# Patient Record
Sex: MALE | Race: WHITE | NOT HISPANIC OR LATINO | Employment: STUDENT | URBAN - METROPOLITAN AREA
[De-identification: names, ages, dates, MRNs, and addresses within clinical notes are randomized per-mention and may not be internally consistent; named-entity substitution may affect disease eponyms.]

---
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
## 2023-09-20 ENCOUNTER — HOSPITAL ENCOUNTER (EMERGENCY)
Facility: HOSPITAL | Age: 23
End: 2023-09-21
Attending: EMERGENCY MEDICINE
Payer: COMMERCIAL

## 2023-09-20 DIAGNOSIS — F32.A DEPRESSION WITH SUICIDAL IDEATION: Primary | ICD-10-CM

## 2023-09-20 DIAGNOSIS — R45.851 DEPRESSION WITH SUICIDAL IDEATION: Primary | ICD-10-CM

## 2023-09-20 LAB
AMPHETAMINES SERPL QL SCN: NEGATIVE
BARBITURATES UR QL: NEGATIVE
BENZODIAZ UR QL: NEGATIVE
COCAINE UR QL: NEGATIVE
ETHANOL EXG-MCNC: 0 MG/DL
FLUAV RNA RESP QL NAA+PROBE: NEGATIVE
FLUBV RNA RESP QL NAA+PROBE: NEGATIVE
METHADONE UR QL: NEGATIVE
OPIATES UR QL SCN: NEGATIVE
OXYCODONE+OXYMORPHONE UR QL SCN: NEGATIVE
PCP UR QL: NEGATIVE
RSV RNA RESP QL NAA+PROBE: NEGATIVE
SARS-COV-2 RNA RESP QL NAA+PROBE: NEGATIVE
THC UR QL: NEGATIVE

## 2023-09-20 PROCEDURE — 99285 EMERGENCY DEPT VISIT HI MDM: CPT

## 2023-09-20 PROCEDURE — 99285 EMERGENCY DEPT VISIT HI MDM: CPT | Performed by: EMERGENCY MEDICINE

## 2023-09-20 PROCEDURE — 80307 DRUG TEST PRSMV CHEM ANLYZR: CPT | Performed by: EMERGENCY MEDICINE

## 2023-09-20 PROCEDURE — 82075 ASSAY OF BREATH ETHANOL: CPT | Performed by: EMERGENCY MEDICINE

## 2023-09-20 PROCEDURE — 0241U HB NFCT DS VIR RESP RNA 4 TRGT: CPT | Performed by: EMERGENCY MEDICINE

## 2023-09-20 RX ORDER — ACETAMINOPHEN 325 MG/1
650 TABLET ORAL EVERY 6 HOURS PRN
Status: CANCELLED | OUTPATIENT
Start: 2023-09-20

## 2023-09-20 RX ORDER — ACETAMINOPHEN 325 MG/1
650 TABLET ORAL EVERY 4 HOURS PRN
Status: CANCELLED | OUTPATIENT
Start: 2023-09-20

## 2023-09-20 RX ORDER — MAGNESIUM HYDROXIDE/ALUMINUM HYDROXICE/SIMETHICONE 120; 1200; 1200 MG/30ML; MG/30ML; MG/30ML
30 SUSPENSION ORAL EVERY 4 HOURS PRN
Status: CANCELLED | OUTPATIENT
Start: 2023-09-20

## 2023-09-20 RX ORDER — TRAZODONE HYDROCHLORIDE 50 MG/1
50 TABLET ORAL
Status: CANCELLED | OUTPATIENT
Start: 2023-09-20

## 2023-09-20 RX ORDER — LORAZEPAM 2 MG/ML
2 INJECTION INTRAMUSCULAR EVERY 6 HOURS PRN
Status: CANCELLED | OUTPATIENT
Start: 2023-09-20

## 2023-09-20 RX ORDER — BISACODYL 10 MG
10 SUPPOSITORY, RECTAL RECTAL DAILY PRN
Status: CANCELLED | OUTPATIENT
Start: 2023-09-20

## 2023-09-20 RX ORDER — POLYETHYLENE GLYCOL 3350 17 G/17G
17 POWDER, FOR SOLUTION ORAL DAILY PRN
Status: CANCELLED | OUTPATIENT
Start: 2023-09-20

## 2023-09-20 RX ORDER — BENZTROPINE MESYLATE 1 MG/ML
0.5 INJECTION INTRAMUSCULAR; INTRAVENOUS
Status: CANCELLED | OUTPATIENT
Start: 2023-09-20

## 2023-09-20 RX ORDER — LANOLIN ALCOHOL/MO/W.PET/CERES
3 CREAM (GRAM) TOPICAL
Status: CANCELLED | OUTPATIENT
Start: 2023-09-20

## 2023-09-20 RX ORDER — HALOPERIDOL 5 MG/ML
5 INJECTION INTRAMUSCULAR
Status: CANCELLED | OUTPATIENT
Start: 2023-09-20

## 2023-09-20 RX ORDER — BENZTROPINE MESYLATE 1 MG/1
1 TABLET ORAL
Status: CANCELLED | OUTPATIENT
Start: 2023-09-20

## 2023-09-20 RX ORDER — HALOPERIDOL 1 MG/1
1 TABLET ORAL EVERY 6 HOURS PRN
Status: CANCELLED | OUTPATIENT
Start: 2023-09-20

## 2023-09-20 RX ORDER — ACETAMINOPHEN 325 MG/1
975 TABLET ORAL EVERY 6 HOURS PRN
Status: CANCELLED | OUTPATIENT
Start: 2023-09-20

## 2023-09-20 RX ORDER — HALOPERIDOL 1 MG/1
2.5 TABLET ORAL
Status: CANCELLED | OUTPATIENT
Start: 2023-09-20

## 2023-09-20 RX ORDER — LORAZEPAM 2 MG/ML
2 INJECTION INTRAMUSCULAR
Status: CANCELLED | OUTPATIENT
Start: 2023-09-20

## 2023-09-20 RX ORDER — HYDROXYZINE HYDROCHLORIDE 25 MG/1
50 TABLET, FILM COATED ORAL
Status: CANCELLED | OUTPATIENT
Start: 2023-09-20

## 2023-09-20 RX ORDER — HYDROXYZINE HYDROCHLORIDE 25 MG/1
25 TABLET, FILM COATED ORAL
Status: CANCELLED | OUTPATIENT
Start: 2023-09-20

## 2023-09-20 RX ORDER — AMOXICILLIN 250 MG
1 CAPSULE ORAL DAILY PRN
Status: CANCELLED | OUTPATIENT
Start: 2023-09-20

## 2023-09-20 RX ORDER — HALOPERIDOL 5 MG/ML
2.5 INJECTION INTRAMUSCULAR
Status: CANCELLED | OUTPATIENT
Start: 2023-09-20

## 2023-09-20 RX ORDER — DIPHENHYDRAMINE HYDROCHLORIDE 50 MG/ML
50 INJECTION INTRAMUSCULAR; INTRAVENOUS EVERY 6 HOURS PRN
Status: CANCELLED | OUTPATIENT
Start: 2023-09-20

## 2023-09-20 RX ORDER — LORAZEPAM 2 MG/ML
1 INJECTION INTRAMUSCULAR
Status: CANCELLED | OUTPATIENT
Start: 2023-09-20

## 2023-09-20 RX ORDER — HYDROXYZINE HYDROCHLORIDE 25 MG/1
100 TABLET, FILM COATED ORAL
Status: CANCELLED | OUTPATIENT
Start: 2023-09-20

## 2023-09-20 RX ORDER — HALOPERIDOL 5 MG/1
5 TABLET ORAL
Status: CANCELLED | OUTPATIENT
Start: 2023-09-20

## 2023-09-20 RX ORDER — BENZTROPINE MESYLATE 1 MG/ML
1 INJECTION INTRAMUSCULAR; INTRAVENOUS
Status: CANCELLED | OUTPATIENT
Start: 2023-09-20

## 2023-09-20 NOTE — LETTER
[address] [facility] [address]  Dept: [phone]      [id number] TRANSFER CONSENT    NAME Juan Carlos Posada DOB 2000                              MRN [id number]    I have been informed of my rights regarding examination, treatment, and transfer   by Dr. Bernarda Alamo DO    Benefits: Specialized equipment and/or services available at the receiving facility (Include comment)________________________    Risks: Potential for delay in receiving treatment      Consent for Transfer:  I acknowledge that my medical condition has been evaluated and explained to me by the emergency department physician or other qualified medical person and/or my attending physician, who has recommended that I be transferred to the service of  Accepting Physician: Dr. Mark Kamara at [address] [place] [address] [address] Name, [address] : [facility], [address] [facility] AFFILIATED WITH [address] The above potential benefits of such transfer, the potential risks associated with such transfer, and the probable risks of not being transferred have been explained to me, and I fully understand them. The doctor has explained that, in my case, the benefits of transfer outweigh the risks. I agree to be transferred. I authorize the performance of emergency medical procedures and treatments upon me in both transit and upon arrival at the receiving facility. Additionally, I authorize the release of any and all medical records to the receiving facility and request they be transported with me, if possible. I understand that the safest mode of transportation during a medical emergency is an ambulance and that the Hospital advocates the use of this mode of transport.  Risks of traveling to the receiving facility by car, including absence of medical control, life sustaining equipment, such as oxygen, and medical personnel has been explained to me and I fully understand them.    ([address])  [X]  I consent to the stated transfer and to be transported by ambulance/helicopter. [  ]  I consent to the stated transfer, but refuse transportation by ambulance and accept full responsibility for my transportation by car. I understand the risks of non-ambulance transfers and I exonerate the Hospital and its staff from any deterioration in my condition that results from this refusal.    X___________________________________________    DATE  23  TIME________  Signature of patient or legally responsible individual signing on patient behalf           RELATIONSHIP TO PATIENT_________________________                  Provider Certification    NAME Zhanna Dickey                                         2000                              MRN [id number]    A medical screening exam was performed on the above named patient. Based on the examination:    Condition Necessitating Transfer The encounter diagnosis was Depression with suicidal ideation.     Patient Condition: The patient has been stabilized such that within reasonable medical probability, no material deterioration of the patient condition or the condition of the unborn child(rolly) is likely to result from the transfer    Reason for Transfer: Level of Care needed not available at this facility    Transfer Requirements: [address] [place] [address]   Space available and qualified personnel available for treatment as acknowledged by Cheryle Coma, [address] [facility], [phone]  Agreed to accept transfer and to provide appropriate medical treatment as acknowledged by       Dr. Pablo De Santiago  Appropriate medical records of the examination and treatment of the patient are provided at the time of transfer   [address] _______  Transfer will be performed by qualified personnel from [address]  and appropriate transfer equipment as required, including the use of necessary and appropriate life support measures. Provider Certification: I have examined the patient and explained the following risks and benefits of being transferred/refusing transfer to the patient/family:         Based on these reasonable risks and benefits to the patient and/or the unborn child(rolly), and based upon the information available at the time of the patient’s examination, I certify that the medical benefits reasonably to be expected from the provision of appropriate medical treatments at another medical facility outweigh the increasing risks, if any, to the individual’s medical condition, and in the case of labor to the unborn child, from effecting the transfer.     X____________________________________________ DATE 09/21/23        TIME_______      ORIGINAL - SEND TO MEDICAL RECORDS   COPY - SEND WITH PATIENT DURING TRANSFER

## 2023-09-21 ENCOUNTER — HOSPITAL ENCOUNTER (INPATIENT)
Facility: HOSPITAL | Age: 23
LOS: 7 days | Discharge: HOME | DRG: 753 | End: 2023-09-28
Attending: PSYCHIATRY & NEUROLOGY | Admitting: PSYCHIATRY & NEUROLOGY
Payer: COMMERCIAL

## 2023-09-21 VITALS
DIASTOLIC BLOOD PRESSURE: 50 MMHG | SYSTOLIC BLOOD PRESSURE: 107 MMHG | TEMPERATURE: 98.1 F | OXYGEN SATURATION: 97 % | HEART RATE: 100 BPM | RESPIRATION RATE: 18 BRPM

## 2023-09-21 DIAGNOSIS — F41.9 ANXIETY: ICD-10-CM

## 2023-09-21 DIAGNOSIS — R45.851 DEPRESSION WITH SUICIDAL IDEATION: ICD-10-CM

## 2023-09-21 DIAGNOSIS — Z72.0 TOBACCO ABUSE: ICD-10-CM

## 2023-09-21 DIAGNOSIS — E55.9 VITAMIN D DEFICIENCY: ICD-10-CM

## 2023-09-21 DIAGNOSIS — F32.A DEPRESSION WITH SUICIDAL IDEATION: ICD-10-CM

## 2023-09-21 DIAGNOSIS — G47.00 INSOMNIA: ICD-10-CM

## 2023-09-21 DIAGNOSIS — F32.A DEPRESSIVE DISORDER: Primary | ICD-10-CM

## 2023-09-21 DIAGNOSIS — E53.8 VITAMIN B12 DEFICIENCY: ICD-10-CM

## 2023-09-21 DIAGNOSIS — J45.909 ASTHMA: ICD-10-CM

## 2023-09-21 LAB
ATRIAL RATE: 85 BPM
P AXIS: 52 DEGREES
PR INTERVAL: 142 MS
QRS AXIS: 87 DEGREES
QRSD INTERVAL: 84 MS
QT INTERVAL: 356 MS
QTC INTERVAL: 423 MS
T WAVE AXIS: 50 DEGREES
VENTRICULAR RATE: 85 BPM

## 2023-09-21 PROCEDURE — 93005 ELECTROCARDIOGRAM TRACING: CPT

## 2023-09-21 PROCEDURE — 93010 ELECTROCARDIOGRAM REPORT: CPT | Performed by: INTERNAL MEDICINE

## 2023-09-21 RX ORDER — HYDROXYZINE 50 MG/1
50 TABLET, FILM COATED ORAL
Status: DISCONTINUED | OUTPATIENT
Start: 2023-09-21 | End: 2023-09-28 | Stop reason: HOSPADM

## 2023-09-21 RX ORDER — BENZTROPINE MESYLATE 1 MG/1
1 TABLET ORAL
Status: DISCONTINUED | OUTPATIENT
Start: 2023-09-21 | End: 2023-09-28 | Stop reason: HOSPADM

## 2023-09-21 RX ORDER — LANOLIN ALCOHOL/MO/W.PET/CERES
3 CREAM (GRAM) TOPICAL
Status: DISCONTINUED | OUTPATIENT
Start: 2023-09-21 | End: 2023-09-28 | Stop reason: HOSPADM

## 2023-09-21 RX ORDER — LORAZEPAM 2 MG/ML
2 INJECTION INTRAMUSCULAR
Status: DISCONTINUED | OUTPATIENT
Start: 2023-09-21 | End: 2023-09-28 | Stop reason: HOSPADM

## 2023-09-21 RX ORDER — HALOPERIDOL 5 MG/1
5 TABLET ORAL
Status: DISCONTINUED | OUTPATIENT
Start: 2023-09-21 | End: 2023-09-28 | Stop reason: HOSPADM

## 2023-09-21 RX ORDER — HYDROXYZINE HYDROCHLORIDE 25 MG/1
25 TABLET, FILM COATED ORAL
Status: DISCONTINUED | OUTPATIENT
Start: 2023-09-21 | End: 2023-09-28 | Stop reason: HOSPADM

## 2023-09-21 RX ORDER — POLYETHYLENE GLYCOL 3350 17 G/17G
17 POWDER, FOR SOLUTION ORAL DAILY PRN
Status: DISCONTINUED | OUTPATIENT
Start: 2023-09-21 | End: 2023-09-23

## 2023-09-21 RX ORDER — TRAZODONE HYDROCHLORIDE 50 MG/1
50 TABLET ORAL
Status: DISCONTINUED | OUTPATIENT
Start: 2023-09-21 | End: 2023-09-25

## 2023-09-21 RX ORDER — MAGNESIUM HYDROXIDE/ALUMINUM HYDROXICE/SIMETHICONE 120; 1200; 1200 MG/30ML; MG/30ML; MG/30ML
30 SUSPENSION ORAL EVERY 4 HOURS PRN
Status: DISCONTINUED | OUTPATIENT
Start: 2023-09-21 | End: 2023-09-28 | Stop reason: HOSPADM

## 2023-09-21 RX ORDER — HALOPERIDOL 5 MG/ML
2.5 INJECTION INTRAMUSCULAR
Status: DISCONTINUED | OUTPATIENT
Start: 2023-09-21 | End: 2023-09-28 | Stop reason: HOSPADM

## 2023-09-21 RX ORDER — DIPHENHYDRAMINE HYDROCHLORIDE 50 MG/ML
50 INJECTION INTRAMUSCULAR; INTRAVENOUS EVERY 6 HOURS PRN
Status: DISCONTINUED | OUTPATIENT
Start: 2023-09-21 | End: 2023-09-28 | Stop reason: HOSPADM

## 2023-09-21 RX ORDER — ACETAMINOPHEN 325 MG/1
975 TABLET ORAL EVERY 6 HOURS PRN
Status: DISCONTINUED | OUTPATIENT
Start: 2023-09-21 | End: 2023-09-28 | Stop reason: HOSPADM

## 2023-09-21 RX ORDER — AMOXICILLIN 250 MG
1 CAPSULE ORAL DAILY PRN
Status: DISCONTINUED | OUTPATIENT
Start: 2023-09-21 | End: 2023-09-28 | Stop reason: HOSPADM

## 2023-09-21 RX ORDER — HYDROXYZINE 50 MG/1
100 TABLET, FILM COATED ORAL
Status: DISCONTINUED | OUTPATIENT
Start: 2023-09-21 | End: 2023-09-28 | Stop reason: HOSPADM

## 2023-09-21 RX ORDER — LORAZEPAM 2 MG/ML
2 INJECTION INTRAMUSCULAR EVERY 6 HOURS PRN
Status: DISCONTINUED | OUTPATIENT
Start: 2023-09-21 | End: 2023-09-28 | Stop reason: HOSPADM

## 2023-09-21 RX ORDER — BENZTROPINE MESYLATE 1 MG/ML
0.5 INJECTION INTRAMUSCULAR; INTRAVENOUS
Status: DISCONTINUED | OUTPATIENT
Start: 2023-09-21 | End: 2023-09-28 | Stop reason: HOSPADM

## 2023-09-21 RX ORDER — ALBUTEROL SULFATE 90 UG/1
1 AEROSOL, METERED RESPIRATORY (INHALATION) EVERY 4 HOURS PRN
Status: DISCONTINUED | OUTPATIENT
Start: 2023-09-21 | End: 2023-09-28 | Stop reason: HOSPADM

## 2023-09-21 RX ORDER — ACETAMINOPHEN 325 MG/1
650 TABLET ORAL EVERY 4 HOURS PRN
Status: DISCONTINUED | OUTPATIENT
Start: 2023-09-21 | End: 2023-09-28 | Stop reason: HOSPADM

## 2023-09-21 RX ORDER — BISACODYL 10 MG
10 SUPPOSITORY, RECTAL RECTAL DAILY PRN
Status: DISCONTINUED | OUTPATIENT
Start: 2023-09-21 | End: 2023-09-23

## 2023-09-21 RX ORDER — HALOPERIDOL 5 MG/ML
5 INJECTION INTRAMUSCULAR
Status: DISCONTINUED | OUTPATIENT
Start: 2023-09-21 | End: 2023-09-28 | Stop reason: HOSPADM

## 2023-09-21 RX ORDER — LORAZEPAM 2 MG/ML
1 INJECTION INTRAMUSCULAR
Status: DISCONTINUED | OUTPATIENT
Start: 2023-09-21 | End: 2023-09-28 | Stop reason: HOSPADM

## 2023-09-21 RX ORDER — ACETAMINOPHEN 325 MG/1
650 TABLET ORAL EVERY 6 HOURS PRN
Status: DISCONTINUED | OUTPATIENT
Start: 2023-09-21 | End: 2023-09-28 | Stop reason: HOSPADM

## 2023-09-21 RX ORDER — HALOPERIDOL 0.5 MG/1
1 TABLET ORAL EVERY 6 HOURS PRN
Status: DISCONTINUED | OUTPATIENT
Start: 2023-09-21 | End: 2023-09-28 | Stop reason: HOSPADM

## 2023-09-21 RX ORDER — BENZTROPINE MESYLATE 1 MG/ML
1 INJECTION INTRAMUSCULAR; INTRAVENOUS
Status: DISCONTINUED | OUTPATIENT
Start: 2023-09-21 | End: 2023-09-28 | Stop reason: HOSPADM

## 2023-09-21 RX ADMIN — MELATONIN 3 MG: at 21:11

## 2023-09-21 RX ADMIN — HYDROXYZINE HYDROCHLORIDE 50 MG: 50 TABLET, FILM COATED ORAL at 21:37

## 2023-09-21 RX ADMIN — NICOTINE POLACRILEX 2 MG: 2 GUM, CHEWING BUCCAL at 21:09

## 2023-09-21 NOTE — ED PROVIDER NOTES
Pt Name: Robin Granados  MRN: [id number]  [address] 2000  Age/Sex: 26 y.o. male  Date of evaluation: 9/20/2023  PCP: [address]    Chief Complaint   Patient presents with   • Depression     Pt went for a physical this morning and told the Dr he was struggling with anxiety and depression. Actively suicidal with a plan. HPI    26 y.o. male presenting with suicidal ideation. Patient states been struggling with depression for some time, has had worsening feelings of depression and hopelessness over the past few weeks, has also been having significantly worsening suicidal thoughts, he states that he has been preparing for his suicide by pushing others away and trying to isolate himself from others so that he hurts a few people as possible when he does kill himself. Patient verbalized having a plan to other staff but is unwilling to discuss with me. He denies homicidal ideation. Patient states that he went to physical exam with a new doctor this morning, screened positive on [place] suicide severity scale, was sent to the ER for further evaluation. HPI      Past Medical and Surgical History    Past Medical History:   Diagnosis Date   • Asthma    • Growth delay    • Testosterone deficiency        History reviewed. No pertinent surgical history. History reviewed. No pertinent family history. Social History     Tobacco Use   • Smoking status: Never   Vaping Use   • Vaping Use: Every day   • Substances: Nicotine   Substance Use Topics   • Alcohol use: Not Currently   • Drug use: Not Currently           Allergies    No Known Allergies    Home Medications    Prior to Admission medications    Medication Sig Start Date End Date Taking?  Authorizing Provider   albuterol (VENTOLIN HFA) 90 mcg/act inhaler Inhale 108 mcg as needed 8/15/16   Historical Provider, MD   famciclovir [facility]) 500 mg tablet Take 1 tablet by mouth 3 (three) times a day for 10 days 1/30/17 2/9/17  Teresa Marshall Pushpa Allison MD           Review of Systems    Review of Systems   Constitutional: Negative for appetite change, chills and diaphoresis. HENT: Negative for drooling, facial swelling, trouble swallowing and voice change. Respiratory: Negative for apnea, shortness of breath and wheezing. Cardiovascular: Negative for chest pain and leg swelling. Gastrointestinal: Negative for abdominal distention, abdominal pain, diarrhea, nausea and vomiting. Genitourinary: Negative for dysuria and urgency. Musculoskeletal: Negative for arthralgias, back pain, gait problem and neck pain. Skin: Negative for color change, rash and wound. Neurological: Negative for seizures, speech difficulty, weakness and headaches. Psychiatric/Behavioral: Positive for dysphoric mood and suicidal ideas. Negative for agitation, behavioral problems and decreased concentration. The patient is not nervous/anxious. All other systems reviewed and negative. Physical Exam      ED Triage Vitals   Temperature Pulse Respirations Blood Pressure SpO2   09/20/23 1940 09/20/23 1939 09/20/23 1939 09/20/23 1939 09/20/23 1939   99.3 °F (37.4 °C) 95 20 124/76 91 %      Temp Source Heart Rate Source Patient Position - Orthostatic VS BP Location FiO2 (%)   09/20/23 1940 09/20/23 1939 09/20/23 1939 09/20/23 1939 --   Temporal Monitor Sitting Left arm       Pain Score       --                      Physical Exam  Vitals and nursing note reviewed. Constitutional:       General: He is not in acute distress. Appearance: He is well-developed. He is not ill-appearing, toxic-appearing or diaphoretic. HENT:      Head: Normocephalic and atraumatic. Right Ear: External ear normal.      Left Ear: External ear normal.      Nose: Nose normal. No congestion or rhinorrhea. Mouth/Throat:      Mouth: Mucous membranes are moist.      Pharynx: Oropharynx is clear.    Eyes:      Conjunctiva/sclera: Conjunctivae normal.      Pupils: Pupils are equal, round, and reactive to light. Neck:      Trachea: No tracheal deviation. Cardiovascular:      Rate and Rhythm: Normal rate and regular rhythm. Pulses: Normal pulses. Heart sounds: Normal heart sounds. No murmur heard. Pulmonary:      Effort: Pulmonary effort is normal. No respiratory distress. Breath sounds: Normal breath sounds. No stridor. No wheezing or rales. Abdominal:      General: There is no distension. Palpations: Abdomen is soft. Tenderness: There is no abdominal tenderness. There is no guarding or rebound. Musculoskeletal:         General: No deformity. Normal range of motion. Cervical back: Normal range of motion and neck supple. No rigidity or tenderness. Right lower leg: No edema. Left lower leg: No edema. Skin:     General: Skin is warm and dry. Capillary Refill: Capillary refill takes less than 2 seconds. Findings: No rash. Neurological:      Mental Status: He is alert and oriented to person, place, and time. Psychiatric:      Comments: Endorsing suicidal ideation as well as depression and anxiety, significant feelings of hopelessness, judgment slightly impaired but still intact, good insight and seeking care at this time. Diagnostic Results      Labs:    Results Reviewed     Procedure Component Value Units Date/Time    FLU/RSV/COVID - if FLU/RSV clinically relevant [[id number]]  (Normal) Collected: 09/20/23 2019    Lab Status: Final result Specimen: Nares from Nose Updated: 09/20/23 2109     SARS-CoV-2 Negative     INFLUENZA A PCR Negative     INFLUENZA B PCR Negative     RSV PCR Negative    Narrative:      FOR PEDIATRIC PATIENTS - copy/paste COVID Guidelines URL to browser: [email or web address]    SARS-CoV-2 assay is a Nucleic Acid Amplification assay intended for the  qualitative detection of nucleic acid from SARS-CoV-2 in nasopharyngeal  swabs.  Results are for the presumptive identification of SARS-CoV-2 RNA. Positive results are indicative of infection with SARS-CoV-2, the virus  causing COVID-19, but do not rule out bacterial infection or co-infection  with other viruses. Laboratories within the [facility] and its  territories are required to report all positive results to the appropriate  public health authorities. Negative results do not preclude SARS-CoV-2  infection and should not be used as the sole basis for treatment or other  patient management decisions. Negative results must be combined with  clinical observations, patient history, and epidemiological information. This test has not been FDA cleared or approved. This test has been authorized by FDA under an Emergency Use Authorization  (EUA). This test is only authorized for the duration of time the  declaration that circumstances exist justifying the authorization of the  emergency use of an in vitro diagnostic tests for detection of SARS-CoV-2  virus and/or diagnosis of COVID-19 infection under section 564(b)(1) of  the Act, 21 U. S.C. 523ZET-5(V)(3), unless the authorization is terminated  or revoked sooner. The test has been validated but independent review by FDA  and CLIA is pending. Test performed using [employer] GeneXpert: This RT-PCR assay targets N2,  a region unique to SARS-CoV-2. A conserved region in the E-gene was chosen  for pan-Sarbecovirus detection which includes SARS-CoV-2. According to CMS-2020-01-R, this platform meets the definition of high-throughput technology.     Rapid drug screen, urine [[id number]]  (Normal) Collected: 09/20/23 2019    Lab Status: Final result Specimen: Urine, Clean Catch Updated: 09/20/23 2102     Amph/Meth UR Negative     Barbiturate Ur Negative     Benzodiazepine Urine Negative     Cocaine Urine Negative     Methadone Urine Negative     Opiate Urine Negative     PCP Ur Negative     THC Urine Negative     Oxycodone Urine Negative    Narrative:      FOR MEDICAL PURPOSES ONLY. IF CONFIRMATION NEEDED PLEASE CONTACT THE LAB WITHIN 5 DAYS. Drug Screen Cutoff Levels:  AMPHETAMINE/METHAMPHETAMINES  1000 ng/mL  BARBITURATES     200 ng/mL  BENZODIAZEPINES     200 ng/mL  COCAINE      300 ng/mL  METHADONE      300 ng/mL  OPIATES      300 ng/mL  PHENCYCLIDINE     25 ng/mL  THC       50 ng/mL  OXYCODONE      100 ng/mL    POCT alcohol breath test [[id number]]  (Normal) Resulted: 09/20/23 2050    Lab Status: Final result Updated: 09/20/23 2050     EXTBreath Alcohol 0.000          All labs reviewed and utilized in the medical decision making process    Radiology:    No orders to display       All radiology studies independently viewed by me and interpreted by the radiologist.    Procedure    Procedures        ED Course of Care and Re-Assessments      Patient medically cleared for inpatient psychiatric admission, 201 signed. Patient accepted for inpatient treatment at [facility] by Dr. Brendon Mchugh  Medications - No data to display        FINAL IMPRESSION    Final diagnoses:   Depression with suicidal ideation         DISPOSITION/PLAN    26 y.o. male with history and symptoms above. Vital signs reassuring, examination unremarkable other than abnormalities in Behavioral Health exam as above. At this time, no evidence of acute intoxication, organic cause of Behavioral Health symptoms, sepsis, meningitis, encephalitis, or other systemic infection, significant trauma, other life threatening condition. Patient medically cleared for inpatient psychiatric admission and accepted for same by on-call physician as above. Hemodynamically stable and comfortable at time of signout to Dr. Meagan Lyons at time of shift change. Estimated time of departure noon on 9/21    Time reflects when diagnosis was documented in both MDM as applicable and the Disposition within this note     Time User Action Codes Description Comment    9/20/2023 10:06 PM Ai Anders. Estrella Khan Depression with suicidal ideation       ED Disposition     ED Disposition   Transfer to [address] [place] [address]    Condition   --    Date/Time   Wed Sep 20, 2023 10:06 PM    Comment   Cliff Philippe should be transferred out to [facility] and has been medically cleared. MD Documentation    Kennedy Crespo Most Recent Value   Sending MD Garcia      Follow-up Information    None           PATIENT REFERRED TO:    No follow-up provider specified. DISCHARGE MEDICATIONS:    Patient's Medications   Discharge Prescriptions    No medications on file       No discharge procedures on file. Alan Saucedo MD    Portions of the record may have been created with voice recognition software. Occasional wrong word or "sound alike" substitutions may have occurred due to the inherent limitations of voice recognition software.   Please read the chart carefully and recognize, using context, where substitutions have occurred     Alan Saucedo MD  09/21/23 0140

## 2023-09-21 NOTE — DISCHARGE INSTR - OTHER ORDERS
You are being discharged to your home located at [address] [facility], [address] [place] [address]. Phone: [phone]     Triggers you have identified during your hospitalization that led to your admission of a distressed mood include feeling unhappy and unstable mental health. Coping skills you have identified during your hospitalization include working on your car or video games. If you are unable to deal with your distressed mood alone please contact Jihan Duncan (Mother) [phone]. If that is not effective and you continue to have a distressed mood, are overwhelmed, or in crisis, please contact (Crisis #) [facility] 67 219 54 17 M0628754, dial 911 or go to the nearest emergency center. [facility] Crisis Hotline: 1 336.429.9484  *National Suicide Prevention Lifeline:  1-298.312.6986  *Alcohol Anonymous: 787.100.2035  *[place] [facility] & Alcohol Commission: (730) 423-6847  [employer] on [address] [place] [address] ([facility]) [employer]: 438.127.1158/Website: www.apolinar.Revolve.  *Substance Abuse and [address] Administration([facility]) [employer], which is a confidential, free, 24-hour-a-day, 365-day-a-year, information service for individuals and family members facing mental health and/or substance use disorders. This service provides referrals to local treatment facilities, support groups, and community-based organizations. Callers can also order free publications and other information. Call 5-468.855.1773/Website: www.[facility].gov  *[facility] 2-1-1: This is a toll free, confidential, 24-hour-a-day service which connects you to a community  in your area who can help you find services and resources that are available to you locally and provide critical services that can improve and save lives. Call: 211  /Website: https://[employer].net/     You declined a follow up primary care provider appointment at this time.      Dao Rivas or Julia, our Aubree and Coreen, will be calling you after your discharge, on the phone number that you provided. They will be available as an additional support, if needed.    If you wish to speak with one of them, you may contact Gertrude Mat Vasquez at [phone] or Derick Jacob at [phone]

## 2023-09-21 NOTE — ED NOTES
Vinicius Pritchard is a 25 y/o male, with no known mental health diagnoses. Pt presented to ED via private transportation due to:  Pt went for a physical this morning and told the Dr he was struggling with anxiety and depression. Actively suicidal with a plan.     Patient accompanied by his mother and both calm and cooperative with the assessment. Pt states he has been feeling depressed for years. Pt has been trying to deal with this on his own involving himself in family gatherings, participating in activities, working. Pt states for the past few months he began to prepare himself to commit suicide. Pt states he slowly pushed his friends away, now he has only 3 friends left. Pt used to have a lot of friends. Now for the past weeks he has been isolating himself from his family as well. Staying at his room, not talking to anyone. Yesterday pt was debating on killing himself by cutting his wrists. Pt reached out to his friends who then provided pt with support. Pt also reached out today to his mother who was not aware of significance of the situation. Currently pt states "I do not want to die but my mind is playing tricks with me right now and I am scared of myself". Pt denies previous attempts. Pt reports triggers from the past being left by his father "cold turkey", his job who he recently resigned from, his family pressing him to be better and more successful. Pt denies HI. Pt attempted to cut himself yesterday but the knife was dull. No prior self harming. Pt denies hallucinations. No trauma reported. Pt willing to sign himself in.

## 2023-09-21 NOTE — ED NOTES
CW received return call from [facility] of [address] for admission:   Phone call placed to [facility]  Phone number: [phone]     Spoke to [facility]     5 days approved. Level of care: 201  Review on 9/25/2023   Authorization # [id number]        Eligibility Verification System checked - ([phone]). Online system / automated system indicates: **    Insurance Authorization for Transportation:    Phone call placed to **. Phone number **. Spoke to **.    Authorization #: **    MARRY, HENNY

## 2023-09-21 NOTE — ED NOTES
Pt tray came at 1210, ate half. Pt sister brought lillian, large drink and donut, pt ate before lunch came.       Neha Alba  09/21/23 1226

## 2023-09-21 NOTE — SOCIAL WORK
Jeff, RN met with pt in exam room for psychosocial, ROIs as new 201 from [facility] ED, presents calm, cooperative, pleasant. Reports coming in for tx for parents, was able to hide depression, anxiety, SI no plan increased over past two weeks, reports mind does not allow him to be happy. Endorses dep 4-5/10, anx 7-8/10; denies current SI/HI/AVH. Stressors/limitations: ineffective coping, unstable mental health, inability to be happy, friend relationships, issues with gf. Strengths: cooperative, negotiates basic needs, family ties. Coping skills: video games, work on car, talking to people. Denies hx, current legal issues, denies all substance use hx except nicotine vape daily-declined smoking cessation, D&A. Has current gf, no children no pets. endorses family depression/anxiety; denies family hx SI/HI/substance use. Lives with family (trung, step father, 7 total siblings), endorses good relationship with family, able to return home. Denies hx AIP, OP psych tx. Graduated HS 2019, no college, no [employer]. Currently unemployed, family assists with financial support. Reports dep/anx starting in 8th grade. Denies access to firearms, one firearm owned by step father but is in locked safe-pt does not have code. Denies religous, cultural needs on unit. Denies assistive devices, physical barriers in the home. Denies current POA, guardianship, advanced directives. Pharm: [facility]. Agreeable to OP psych, declined D&A, pcp appt.      ROIs signed  Psych  Rodrick Swanson (Mother) [phone]

## 2023-09-21 NOTE — SOCIAL WORK
Fan Carvalho (Mother) [phone] notified of pts admission, nursing/Sw contacts. Fer Lily requesting a return call in ten mins as she is currently in the grocery store.

## 2023-09-21 NOTE — ED NOTES
CW placed call to [facility], spoke w/Anitra. As per [facility], a request for return to complete pre-cert has been placed within the queue.     TDS, CW

## 2023-09-21 NOTE — H&P
Brii Causey  IFX:[id number]    FVU:[id number]  Adm Date: 9/21/2023 1339  1:39 PM   ATT PHY: Ileana Wolff Md  [id number] [address]         Chief Complaint: depression, suicidal ideations      History of Presenting Illness: Camille Gonsalez is a(n) 26y.o. year old male who is admitted to [address] [place] [address] on voluntary 201 committment basis. Patient originally presented to [address] [place] [address] ED on 9/20/2023 for worsening depression with suicidal ideations. Patient examined at bedside. Patient currently denies any physical symptoms. Denies chest pain, shortness of breath, nausea, vomiting, abdominal pain, headache, dizziness. No Known Allergies    No current facility-administered medications on file prior to encounter. Current Outpatient Medications on File Prior to Encounter   Medication Sig Dispense Refill   • albuterol (VENTOLIN HFA) 90 mcg/act inhaler Inhale 108 mcg as needed     • famciclovir (FAMVIR) 500 mg tablet Take 1 tablet by mouth 3 (three) times a day for 10 days 30 tablet 0     Active Ambulatory Problems     Diagnosis Date Noted   • No Active Ambulatory Problems     Resolved Ambulatory Problems     Diagnosis Date Noted   • No Resolved Ambulatory Problems     Past Medical History:   Diagnosis Date   • Asthma    • Growth delay    • Testosterone deficiency      No past surgical history on file.     Social History:   Social History     Socioeconomic History   • Marital status: Single     Spouse name: None   • Number of children: None   • Years of education: None   • Highest education level: None   Occupational History   • None   Tobacco Use   • Smoking status: Never   • Smokeless tobacco: None   Vaping Use   • Vaping Use: Every day   • Substances: Nicotine   Substance and Sexual Activity   • Alcohol use: Not Currently   • Drug use: Not Currently   • Sexual activity: None   Other Topics Concern   • None   Social History Narrative   • None     Social Determinants of Health     Financial Resource Strain: Not on file   Food Insecurity: Not on file   Transportation Needs: Not on file   Physical Activity: Not on file   Stress: Not on file   Social Connections: Not on file   Intimate Partner Violence: Not on file   Housing Stability: Not on file     Family History: History reviewed. No pertinent family history. Review of Systems   Constitutional: Negative for chills and fever. HENT: Negative for ear pain and sore throat. Eyes: Negative for pain and visual disturbance. Respiratory: Negative for cough and shortness of breath. Cardiovascular: Negative for chest pain and palpitations. Gastrointestinal: Negative for abdominal pain and vomiting. Genitourinary: Negative for dysuria and hematuria. Musculoskeletal: Negative for arthralgias and back pain. Skin: Negative for color change and rash. Neurological: Negative for seizures and syncope. Psychiatric/Behavioral: Positive for dysphoric mood and suicidal ideas. The patient is nervous/anxious. All other systems reviewed and are negative. Physical Exam   Vitals: Blood pressure 112/53, pulse 74, temperature 98.3 °F (36.8 °C), temperature source Temporal, resp. rate 18, height 5' 7" (1.702 m), weight 48.5 kg (107 lb), SpO2 99 %. ,Body mass index is 16.76 kg/m². Constitutional: Awake, alert, in no acute distress. Head: Normocephalic and atraumatic. Mouth/Throat: Oropharynx is clear and moist.    Eyes: Conjunctivae and EOM are normal.   Neck: Neck supple. No thyromegaly present. Cardiovascular: Normal rate, regular rhythm and normal heart sounds. Pulmonary/Chest: Effort normal and breath sounds normal.   Abdominal: Soft. Bowel sounds are normal. There is no tenderness. There is no rebound and no guarding. Neurological: No focal deficits. Skin: Skin is warm and dry. No edema.      Assessment     Mathias Cogan is a(n) 26 y.o. male with bipolar disorder. 1.  Asthma. Stable. Albuterol inhaler as needed. 2.  Allergic rhinitis. Stable. 3.  Insomnia. Patient is on melatonin at bedtime. 4.  Low BMI. Dietician consulted. 5.  Psych with bipolar disorder. This is being managed by the psych team.     Prognosis: Fair. Discharge Plan: In progress. Advanced Directives: I have discussed in detail with the patient the advanced directives. The patient does not have an appointed POA and does not have a living will. Patient's emergency contact is his mother, Kt Coates, whose number is [phone]. When discussing cardiac and pulmonary resuscitation efforts with the patient, the patient wishes to be full code. I have spent more than 50 minutes gathering data, doing physical examination, and discussing the advanced directives, which was witnessed by caring staff. The patient was discussed with Dr. Gracie Chaudhry and he is in agreement with the above note.

## 2023-09-21 NOTE — NURSING NOTE
Patient admitted to unit walking from ED. Preformed skin check with Myron Gamez RN patient skin unremarkable. Patient stated was having thoughts of suicide with plan to cut wrists. States was isolating self from family and friends because he didn't want to hurt them. Patient currently denies SI/HI, AVH  States moderate depression and severe anxiety. Patient was cooperative during admission process. Showed around unit introduced to peers and roommate.

## 2023-09-21 NOTE — SOCIAL WORK
Contact with Norberto Alford (Mother) [phone] provided, sw/nursing contact provided. Mother plans to drop clothing off-expectations provided for clothing. Virtual rounding info provided, update on pt progress provided. Call ended mutually.

## 2023-09-21 NOTE — ED NOTES
Patient is accepted at [facility]. Patient is accepted by Dr. Emily Fermin per Eugene/Shelley. Transportation is arranged with [employer]. Transportation is scheduled for pending. Patient may go to the floor at anytime  after 10am.          Nurse report is to be called to [phone] prior to patient transfer.

## 2023-09-21 NOTE — ED NOTES
[facility] claimed the ride for Juan Carlos Posada in [facility] ED 13 bed ED 13, and will arrive on 09/21/2023 at 12:00pm

## 2023-09-21 NOTE — ED NOTES
Crisis prepared 201 and obtained signatures. Pt informed about IP psychiatric process, 72 hours notice and his 201 rights.

## 2023-09-21 NOTE — PLAN OF CARE
Problem: Ineffective Coping  Goal: Cooperates with admission process  Description: Interventions:   - Complete admission process  Outcome: Progressing  Goal: Identifies healthy coping skills  Outcome: Progressing  Goal: Participates in unit activities  Description: Interventions:  - Provide therapeutic environment   - Provide required programming   - Redirect inappropriate behaviors   Outcome: Progressing     Problem: Risk for Self Injury/Neglect  Goal: Verbalize thoughts and feelings  Description: Interventions:  - Assess and re-assess patient's lethality and potential for self-injury  - Engage patient in 1:1 interactions, daily, for a minimum of 15 minutes  - Encourage patient to express feelings, fears, frustrations, hopes  - Establish rapport/trust with patient   Outcome: Progressing  Goal: Refrain from harming self  Description: Interventions:  - Monitor patient closely, per order  - Develop a trusting relationship  - Supervise medication ingestion, monitor effects and side effects   Outcome: Progressing     Problem: Depression  Goal: Treatment Goal: Demonstrate behavioral control of depressive symptoms, verbalize feelings of improved mood/affect, and adopt new coping skills prior to discharge  Outcome: Progressing

## 2023-09-22 PROBLEM — F32.A DEPRESSIVE DISORDER: Status: ACTIVE | Noted: 2023-09-22

## 2023-09-22 LAB
25(OH)D3 SERPL-MCNC: 19.3 NG/ML (ref 30–100)
ALBUMIN SERPL BCP-MCNC: 4 G/DL (ref 3.5–5)
ALP SERPL-CCNC: 68 U/L (ref 34–104)
ALT SERPL W P-5'-P-CCNC: 12 U/L (ref 7–52)
ANION GAP SERPL CALCULATED.3IONS-SCNC: 5 MMOL/L
AST SERPL W P-5'-P-CCNC: 15 U/L (ref 13–39)
BASOPHILS # BLD AUTO: 0.02 THOUSANDS/ÂΜL (ref 0–0.1)
BASOPHILS NFR BLD AUTO: 0 % (ref 0–1)
BILIRUB SERPL-MCNC: 0.9 MG/DL (ref 0.2–1)
BUN SERPL-MCNC: 17 MG/DL (ref 5–25)
CALCIUM SERPL-MCNC: 9.3 MG/DL (ref 8.4–10.2)
CHLORIDE SERPL-SCNC: 104 MMOL/L (ref 96–108)
CHOLEST SERPL-MCNC: 77 MG/DL
CO2 SERPL-SCNC: 28 MMOL/L (ref 21–32)
CREAT SERPL-MCNC: 0.62 MG/DL (ref 0.6–1.3)
EOSINOPHIL # BLD AUTO: 0.14 THOUSAND/ÂΜL (ref 0–0.61)
EOSINOPHIL NFR BLD AUTO: 3 % (ref 0–6)
ERYTHROCYTE [DISTWIDTH] IN BLOOD BY AUTOMATED COUNT: 12.2 % (ref 11.6–15.1)
FOLATE SERPL-MCNC: 10.9 NG/ML
GFR SERPL CREATININE-BSD FRML MDRD: 140 ML/MIN/1.73SQ M
GLUCOSE SERPL-MCNC: 92 MG/DL (ref 65–140)
HCT VFR BLD AUTO: 40.7 % (ref 36.5–49.3)
HDLC SERPL-MCNC: 36 MG/DL
HGB BLD-MCNC: 13.4 G/DL (ref 12–17)
IMM GRANULOCYTES # BLD AUTO: 0.01 THOUSAND/UL (ref 0–0.2)
IMM GRANULOCYTES NFR BLD AUTO: 0 % (ref 0–2)
LDLC SERPL CALC-MCNC: 32 MG/DL (ref 0–100)
LYMPHOCYTES # BLD AUTO: 1.96 THOUSANDS/ÂΜL (ref 0.6–4.47)
LYMPHOCYTES NFR BLD AUTO: 41 % (ref 14–44)
MCH RBC QN AUTO: 28.6 PG (ref 26.8–34.3)
MCHC RBC AUTO-ENTMCNC: 32.9 G/DL (ref 31.4–37.4)
MCV RBC AUTO: 87 FL (ref 82–98)
MONOCYTES # BLD AUTO: 0.51 THOUSAND/ÂΜL (ref 0.17–1.22)
MONOCYTES NFR BLD AUTO: 11 % (ref 4–12)
NEUTROPHILS # BLD AUTO: 2.18 THOUSANDS/ÂΜL (ref 1.85–7.62)
NEUTS SEG NFR BLD AUTO: 45 % (ref 43–75)
NONHDLC SERPL-MCNC: 41 MG/DL
NRBC BLD AUTO-RTO: 0 /100 WBCS
PLATELET # BLD AUTO: 248 THOUSANDS/UL (ref 149–390)
PMV BLD AUTO: 10.9 FL (ref 8.9–12.7)
POTASSIUM SERPL-SCNC: 4.2 MMOL/L (ref 3.5–5.3)
PROT SERPL-MCNC: 6.1 G/DL (ref 6.4–8.4)
RBC # BLD AUTO: 4.68 MILLION/UL (ref 3.88–5.62)
SODIUM SERPL-SCNC: 137 MMOL/L (ref 135–147)
T4 FREE SERPL-MCNC: 2.69 NG/DL (ref 0.61–1.12)
TRIGL SERPL-MCNC: 43 MG/DL
TSH SERPL DL<=0.05 MIU/L-ACNC: <0.01 UIU/ML (ref 0.45–4.5)
VIT B12 SERPL-MCNC: 217 PG/ML (ref 180–914)
WBC # BLD AUTO: 4.82 THOUSAND/UL (ref 4.31–10.16)

## 2023-09-22 PROCEDURE — 82746 ASSAY OF FOLIC ACID SERUM: CPT | Performed by: PSYCHIATRY & NEUROLOGY

## 2023-09-22 PROCEDURE — 85025 COMPLETE CBC W/AUTO DIFF WBC: CPT | Performed by: PSYCHIATRY & NEUROLOGY

## 2023-09-22 PROCEDURE — 84439 ASSAY OF FREE THYROXINE: CPT | Performed by: PSYCHIATRY & NEUROLOGY

## 2023-09-22 PROCEDURE — 82306 VITAMIN D 25 HYDROXY: CPT | Performed by: PSYCHIATRY & NEUROLOGY

## 2023-09-22 PROCEDURE — 99223 1ST HOSP IP/OBS HIGH 75: CPT | Performed by: PSYCHIATRY & NEUROLOGY

## 2023-09-22 PROCEDURE — 80061 LIPID PANEL: CPT | Performed by: PSYCHIATRY & NEUROLOGY

## 2023-09-22 PROCEDURE — 80053 COMPREHEN METABOLIC PANEL: CPT | Performed by: PSYCHIATRY & NEUROLOGY

## 2023-09-22 PROCEDURE — 84443 ASSAY THYROID STIM HORMONE: CPT | Performed by: PSYCHIATRY & NEUROLOGY

## 2023-09-22 PROCEDURE — 82607 VITAMIN B-12: CPT | Performed by: PSYCHIATRY & NEUROLOGY

## 2023-09-22 RX ORDER — ESCITALOPRAM OXALATE 5 MG/1
5 TABLET ORAL DAILY
Status: DISCONTINUED | OUTPATIENT
Start: 2023-09-22 | End: 2023-09-22

## 2023-09-22 RX ORDER — ESCITALOPRAM OXALATE 10 MG/1
10 TABLET ORAL DAILY
Status: DISCONTINUED | OUTPATIENT
Start: 2023-09-22 | End: 2023-09-28 | Stop reason: HOSPADM

## 2023-09-22 RX ADMIN — NICOTINE POLACRILEX 2 MG: 2 GUM, CHEWING BUCCAL at 14:25

## 2023-09-22 RX ADMIN — NICOTINE POLACRILEX 2 MG: 2 GUM, CHEWING BUCCAL at 17:04

## 2023-09-22 RX ADMIN — HYDROXYZINE HYDROCHLORIDE 50 MG: 50 TABLET, FILM COATED ORAL at 08:57

## 2023-09-22 RX ADMIN — ESCITALOPRAM OXALATE 10 MG: 10 TABLET ORAL at 16:34

## 2023-09-22 RX ADMIN — NICOTINE POLACRILEX 2 MG: 2 GUM, CHEWING BUCCAL at 19:46

## 2023-09-22 RX ADMIN — NICOTINE POLACRILEX 2 MG: 2 GUM, CHEWING BUCCAL at 11:36

## 2023-09-22 RX ADMIN — NICOTINE POLACRILEX 2 MG: 2 GUM, CHEWING BUCCAL at 18:46

## 2023-09-22 RX ADMIN — MELATONIN 3 MG: at 21:38

## 2023-09-22 RX ADMIN — NICOTINE POLACRILEX 2 MG: 2 GUM, CHEWING BUCCAL at 09:12

## 2023-09-22 NOTE — NURSING NOTE
Patient requested and received Prn atarax 50mg for moderate anxiety. Patient reports he is having a lot of anxiety r/t being around other patients on the unit.  Will monitor effectiveness of Prn.

## 2023-09-22 NOTE — PROGRESS NOTES
Progress Note - Daren Hernandez 26 y.o. male MRN: [id number]    Unit/Bed#: [facility] 224-01 Encounter: [id number]        Subjective:   Patient seen and examined at bedside after reviewing the chart and discussing the case with the caring staff. Patient examined at bedside. Patient has no acute issues. Patient's labs including vitamin D and B12 levels are still pending. Physical Exam   Vitals: Blood pressure (!) 107/47, pulse 88, temperature 97.5 °F (36.4 °C), temperature source Temporal, resp. rate 18, height 5' 7" (1.702 m), weight 48.5 kg (107 lb), SpO2 97 %. ,Body mass index is 16.76 kg/m². Constitutional: He appears well-developed. HEENT: PERR, EOMI, MMM  Cardiovascular: Normal rate and regular rhythm. Pulmonary/Chest: Effort normal and breath sounds normal.   Abdomen: Soft, + BS, NT    Assessment/Plan:  Daren Hernandez is a(n) 26 y.o. male with bipolar disorder.      1. Asthma. Stable. Albuterol inhaler as needed. 2.  Allergic rhinitis. Stable. 3.  Insomnia. Patient is on melatonin at bedtime. 4.  Low BMI. Dietician consulted.

## 2023-09-22 NOTE — TREATMENT TEAM
09/21/23 2100   Fonseca Anxiety Scale   Anxious Mood 3   Tension 3   Fears 3   Insomnia 2   Intellectual 2   Depressed Mood 2   Somatic Complaints: Muscular 0   Somatic Complaints: Sensory 0   Cardiovascular Symptoms 0   Respiratory Symptoms 0   Gastrointestinal Symptoms 0   Genitourinary Symptoms 0   Autonomic Symptoms 0   Behavior at Interview 3   Fonseca Anxiety Score 18   PRN atarax 50mg for moderate anxiety.  Will continue to monitor for effectiveness

## 2023-09-22 NOTE — MALNUTRITION/BMI
This medical record reflects one or more clinical indicators suggestive of malnutrition and/or morbid obesity. Malnutrition Findings:   Adult Malnutrition type: Chronic illness  Adult Degree of Malnutrition: Malnutrition of moderate degree  Malnutrition Characteristics: Inadequate energy, Muscle loss, Fat loss              360 Statement: Malnutrition related to inadequate energy intake as evidenced by chronically low body weight, food insecurity,  <75% energy intake compared to estimated needs >1 month, subcutaneous fat loss extremities and trunk. Regular diet thin liquids with ensure plus high protein at dinner. BMI Findings:  Adult BMI Classifications: Underweight < 18.5        Body mass index is 16.76 kg/m². See Nutrition note dated 9/22/2023 for additional details. Completed nutrition assessment is viewable in the nutrition documentation.

## 2023-09-22 NOTE — PROGRESS NOTES
09/22/23 1100   Activity/Group Checklist   Group   (Art Therapy Processing)   Attendance Attended   Attendance Duration (min) 46-60   Interactions Interacted appropriately   Affect/Mood Appropriate   Goals Achieved Identified feelings; Identified relapse prevention strategies; Discussed coping strategies; Identified resources and support systems; Able to listen to others; Able to engage in interactions; Able to reflect/comment on own behavior;Able to manage/cope with feelings

## 2023-09-22 NOTE — PROGRESS NOTES
09/22/23 1000   Activity/Group Checklist   Group   (Wellness and Self Reflection)   Attendance Attended   Attendance Duration (min) 16-30   Interactions Interacted appropriately   Affect/Mood Appropriate   Goals Achieved Identified feelings; Discussed coping strategies; Able to listen to others; Able to engage in interactions; Able to reflect/comment on own behavior;Able to manage/cope with feelings

## 2023-09-22 NOTE — PROGRESS NOTES
09/22/23 1330   Activity/Group Checklist   Group   (Self Assessment and Relapse Prevention Planning)   Attendance Attended   Attendance Duration (min) 16-30   Interactions Interacted appropriately   Affect/Mood Appropriate;Bright;Calm   Goals Achieved Identified feelings; Identified triggers; Identified relapse prevention strategies; Discussed coping strategies; Identified resources and support systems; Able to listen to others; Able to engage in interactions; Able to reflect/comment on own behavior;Able to manage/cope with feelings

## 2023-09-22 NOTE — NURSING NOTE
New orders to start leaxapro 10 mg daily. Provided medication on Lexapro and dministered first dose as prescribed. Pt verbalized understanding.
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
## 2023-09-22 NOTE — PROGRESS NOTES
09/22/23 4302   Activity/Group Checklist   Group   (Community Meeting and Check-In)   Attendance Attended   Attendance Duration (min) 46-60   Interactions Interacted appropriately   Affect/Mood Appropriate;Bright;Calm   Goals Achieved Identified feelings; Discussed coping strategies; Able to listen to others; Able to engage in interactions; Able to reflect/comment on own behavior;Able to manage/cope with feelings

## 2023-09-22 NOTE — PROGRESS NOTES
09/22/23   Team Meeting   Meeting Type Daily Rounds   Team Members Present   Team Members Present Physician;Nurse;   Physician Team Member Dr. Sofia Webb DO; All Cortez; Dr. Maggie Espinal MD; Dr. Rachel Patten MD   Nursing Team Member PRAVEEN Dixon; Ilana Miramontes RN   Social Work Team Member [place], [place]   Patient/Family Present   Patient Present No   Patient's Family Present No     New 201, SI with plan, increased dep/anx, immature, first AIP, social anxiety-50mg Atarax, visible, social, over stimulated.

## 2023-09-22 NOTE — NURSING NOTE
Pt. Belongings to room:  Socks x3  Undies x3  Shirt x2  Pants x2    Pt. Belongings to storage:  Shoes  Hoodie  Bag    Pt. Belongings to drawer:  Phone      Pt.  Belongings to security  ([id number])  Yellow chain  $10  Debit x2 Debit () x2  PA ID ()  SC DL  [facility]  Wallet

## 2023-09-22 NOTE — NURSING NOTE
Pt was visible in milieu, pt was calm and cooperative with peers and staff,but seemed anxious. Pt was given 50 mg Atarax on 18 points on lozano scale. Upon assessment pt claimed to be feeling much better. Staff maintained Q 7 safety checks,administered medications as prescribed and assisted as needed.  We will continue to monitor ,support and encourage

## 2023-09-22 NOTE — TREATMENT PLAN
TREATMENT PLAN REVIEW - 62 [facility] 23 y.o. 2000 male MRN: [id number]    [address] [address] [address] Room / Bed: Slim Chacon [address] [place] [address] Encounter: [id number]          Admit Date/Time:  9/21/2023  1:39 PM    Treatment Team: Attending Provider: Reva Alves MD; : Aye Montana; Care Manager: Stephane Dunn RN; Patient Care Assistant: Trina Dye; Patient Care Assistant: Vanessa Beard;  Recreational Therapist: Yee Yao; Patient Care Technician: Silvia Coffey; Registered Nurse: Mihir Cooper RN; Dietitian: Genevieve Pedersen RD    Diagnosis: Principal Problem:    Depressive disorder      Patient Strengths/Assets: cooperative, good support system, patient is on a voluntary commitment, sense of humor    Patient Barriers/Limitations: difficulty adapting, poor interpersonal skills    Short Term Goals: decrease in depressive symptoms, decrease in anxiety symptoms, decrease in suicidal thoughts, mood stabilization    Long Term Goals: improvement in depression, improvement in anxiety, resolution of depressive symptoms, stabilization of mood, free of suicidal thoughts    Progress Towards Goals: starting psychiatric medications as prescribed    Recommended Treatment: medication management, patient medication education, group therapy, milieu therapy, continued Behavioral Health psychiatric evaluation/assessment process    Treatment Frequency: daily medication monitoring, group and milieu therapy daily, monitoring through interdisciplinary rounds, monitoring through weekly patient care conferences    Expected Discharge Date:  5 to 7 midnights    Discharge Plan: referrals as indicated    Treatment Plan Created/Updated By: Pierce Severs, DO

## 2023-09-22 NOTE — NURSING NOTE
Pt was observed laying on bed with eyes closed, normal breathing pattern. Staff maintained Q 7 security checks. We will continue to monitor and assist as needed

## 2023-09-22 NOTE — PROGRESS NOTES
09/22/23 1102   Team Meeting   Meeting Type Tx Team Meeting   Team Members Present   Team Members Present Physician;Nurse;   Physician Team Member Dr. Rochelle Boyd DO   Nursing Team Member Rach Vences, PRAVEEN   Social Work Team Member Prem Maguire [place]   Patient/Family Present   Patient Present Yes   Patient's Family Present No     Patient and treatment team met and reviewed pt strengths, limitations, coping skills, treatment plan and goals; pt agreeable and signed; copy on chart

## 2023-09-22 NOTE — NUTRITION
09/22/23 1358   Biochemical Data,Medical Tests, and Procedures   Biochemical Data/Medical Tests/Procedures Lab values reviewed; Meds reviewed   Labs (Comment) 9/22 pro:6.1, HDL;36, CBC WNL, TSH:<0.010   Meds (Comment) cogentin, lexapro, haldol, ativan, melatonin, desyrel   Nutrition-Focused Physical Exam   Nutrition-Focused Physical Exam Findings RN skin assessment reviewed; No skin issues documented   Medical-Related Concerns asthma, testosterone deficiency   Adequacy of Intake   Nutrition Modality PO   Feeding Route   PO Independent   Current PO Intake   Current Diet Order Regular diet thin liquids   Current Meal Intake %   Estimated calorie intake compared to estimated need Anticipate nutrient needs will be met. PES Statement   Problem Clinical   Weight (3) Underweight NC-3.1   Related to Other (Comment)  (prolonged inadequate intake)   As evidenced by: BMI   Recommendations/Interventions   Malnutrition/BMI Present Yes   Adult Malnutrition type Chronic illness   Adult Degree of Malnutrition Malnutrition of moderate degree   Malnutrition Characteristics Inadequate energy;Muscle loss; Fat loss   Adult BMI Classifications Underweight < 18.5   360 Statement Malnutrition related to inadequate energy intake as evidenced by chronically low body weight, food insecurity,  <75% energy intake compared to estimated needs >1 month, subcutaneous fat loss extremities and trunk. Summary Consult: nutrition assessment. RN consult; underweight. Regular diet thin liquids. No diet plan. Patient states he believes he may be lactose intolerant but is not strict in restricting lactose. He reports consuming 1 good meal per day. He states there are 7-8 kids in the home so some days they are low on food or food is prepared that he does not like so he doesn't eat. He states currently his appetite is great and he is eating well. 9/21/#; 11/16/#. Patient with chronic low body weight.  He states his highest was was ~120# and his lowest weight was 95#-98#. Skin intact. Discussed nutrition supplements. He is agreeable to ensure plus high protein at dinner.    Interventions/Recommendations Continue current diet order;Supplement initiate   Intervention Comments ensure plus high protein at dinner   Education Assessment   Education Education not indicated at this time   Patient Nutrition Goals   Goal Avoid weight loss;Meet PO needs   Goal Status Initiated   Timeframe to complete goal by next f/u   Nutrition Complexity Risk   Nutrition complexity level High risk   Follow up date 09/29/23

## 2023-09-22 NOTE — NURSING NOTE
Patient visible and cooperative on unit. Bright upon approach, pleasant, and engaged. Reports sleeping well during the night. Patient was complaining of anxiety in the morning related to being around others on the unit, given prn atarax which was effective. Patient denies any suicidal ideations. He attends groups. Socializes with peers. He denies any concerns. q7 minute checks maintained. Staff availability reinforced.

## 2023-09-23 PROBLEM — E44.0 MODERATE PROTEIN-CALORIE MALNUTRITION (HCC): Status: ACTIVE | Noted: 2023-09-23

## 2023-09-23 PROCEDURE — 99232 SBSQ HOSP IP/OBS MODERATE 35: CPT

## 2023-09-23 RX ORDER — POLYETHYLENE GLYCOL 3350 17 G/17G
17 POWDER, FOR SOLUTION ORAL DAILY PRN
Status: DISCONTINUED | OUTPATIENT
Start: 2023-09-23 | End: 2023-09-28 | Stop reason: HOSPADM

## 2023-09-23 RX ORDER — MELATONIN
1000 DAILY
Status: DISCONTINUED | OUTPATIENT
Start: 2023-11-25 | End: 2023-09-28 | Stop reason: HOSPADM

## 2023-09-23 RX ORDER — BISACODYL 10 MG
10 SUPPOSITORY, RECTAL RECTAL DAILY PRN
Status: DISCONTINUED | OUTPATIENT
Start: 2023-09-23 | End: 2023-09-28 | Stop reason: HOSPADM

## 2023-09-23 RX ORDER — ERGOCALCIFEROL 1.25 MG/1
50000 CAPSULE ORAL WEEKLY
Status: DISCONTINUED | OUTPATIENT
Start: 2023-09-23 | End: 2023-09-28 | Stop reason: HOSPADM

## 2023-09-23 RX ADMIN — CYANOCOBALAMIN TAB 500 MCG 500 MCG: 500 TAB at 18:39

## 2023-09-23 RX ADMIN — NICOTINE POLACRILEX 2 MG: 2 GUM, CHEWING BUCCAL at 18:37

## 2023-09-23 RX ADMIN — ERGOCALCIFEROL 50000 UNITS: 1.25 CAPSULE ORAL at 18:39

## 2023-09-23 RX ADMIN — HYDROXYZINE HYDROCHLORIDE 50 MG: 50 TABLET, FILM COATED ORAL at 08:52

## 2023-09-23 RX ADMIN — NICOTINE POLACRILEX 2 MG: 2 GUM, CHEWING BUCCAL at 14:57

## 2023-09-23 RX ADMIN — NICOTINE POLACRILEX 2 MG: 2 GUM, CHEWING BUCCAL at 09:29

## 2023-09-23 RX ADMIN — ESCITALOPRAM OXALATE 10 MG: 10 TABLET ORAL at 08:52

## 2023-09-23 RX ADMIN — MELATONIN 3 MG: at 21:48

## 2023-09-23 NOTE — NURSING NOTE
Presents with euthymia,anxious type,rapid ,rambling speech . He denies depression,anxiety,SI,HI,AH,VH. He is visible on the unit,social with both staff and peers,is compliant with medications and unit rules. We discussed s/s of impending psychological decompensation and when to seek assistance. Will continue to educate,monitor,and provide safe,therapeutic milieu.

## 2023-09-23 NOTE — TREATMENT TEAM
09/23/23 0850   Fonseca Anxiety Scale   Anxious Mood 3   Tension 3   Fears 3   Insomnia 2   Intellectual 1   Depressed Mood 2   Somatic Complaints: Muscular 1   Somatic Complaints: Sensory 0   Cardiovascular Symptoms 0   Respiratory Symptoms 0   Gastrointestinal Symptoms 0   Genitourinary Symptoms 0   Autonomic Symptoms 0   Behavior at Interview 3   Fonseca Anxiety Score 18

## 2023-09-23 NOTE — NURSING NOTE
Pt slept through the night with no signs of distress and no acute behaviors observed. Pt is currently sleeping. Q7 safety checks maintained. Will CTM.

## 2023-09-23 NOTE — PROGRESS NOTES
Progress Note - Anny Elias 26 y.o. male MRN: [id number]    Unit/Bed#: [facility] 224-01 Encounter: [id number]        Subjective:   Patient seen and examined at bedside after reviewing the chart and discussing the case with the caring staff. Patient examined at bedside. Patient has no acute issues. On review of patient's labs patient's vitamin D level was found to be low at 19.3 and B12 level is also low 217. Physical Exam   Vitals: Blood pressure 119/52, pulse 79, temperature 99.5 °F (37.5 °C), temperature source Temporal, resp. rate 17, height 5' 7" (1.702 m), weight 50.1 kg (110 lb 6.4 oz), SpO2 97 %. ,Body mass index is 17.29 kg/m². Constitutional: He appears well-developed. HEENT: PERR, EOMI, MMM  Cardiovascular: Normal rate and regular rhythm. Pulmonary/Chest: Effort normal and breath sounds normal.   Abdomen: Soft, + BS, NT    Assessment/Plan:  Anny Elias is a(n) 26 y.o. male with bipolar disorder.      1. Asthma. Stable. Albuterol inhaler as needed. 2.  Allergic rhinitis. Stable. 3.  Insomnia. Patient is on melatonin at bedtime. 4.  Low BMI. Dietician consulted. 5.  New vitamin D deficiency. I will put the patient on vitamin D bolus doses for 10 weeks followed by vitamin D3 1000 units daily. 6.  New vitamin B12 deficiency. I will put the patient on vitamin B12 supplements.

## 2023-09-23 NOTE — PLAN OF CARE
Problem: Ineffective Coping  Goal: Cooperates with admission process  Description: Interventions:   - Complete admission process  Outcome: Completed     Problem: Ineffective Coping  Goal: Participates in unit activities  Description: Interventions:  - Provide therapeutic environment   - Provide required programming   - Redirect inappropriate behaviors   Outcome: Progressing     Problem: Ineffective Coping  Goal: Patient/Family verbalizes awareness of resources  Outcome: Progressing     Problem: Risk for Self Injury/Neglect  Goal: Treatment Goal: Remain safe during length of stay, learn and adopt new coping skills, and be free of self-injurious ideation, impulses and acts at the time of discharge  Outcome: Progressing     Problem: Risk for Self Injury/Neglect  Goal: Verbalize thoughts and feelings  Description: Interventions:  - Assess and re-assess patient's lethality and potential for self-injury  - Engage patient in 1:1 interactions, daily, for a minimum of 15 minutes  - Encourage patient to express feelings, fears, frustrations, hopes  - Establish rapport/trust with patient   Outcome: Progressing     Problem: Risk for Self Injury/Neglect  Goal: Refrain from harming self  Description: Interventions:  - Monitor patient closely, per order  - Develop a trusting relationship  - Supervise medication ingestion, monitor effects and side effects   Outcome: Progressing     Problem: Depression  Goal: Verbalize thoughts and feelings  Description: Interventions:  - Assess and re-assess patient's level of risk   - Engage patient in 1:1 interactions, daily, for a minimum of 15 minutes   - Encourage patient to express feelings, fears, frustrations, hopes   Outcome: Progressing     Problem: Depression  Goal: Refrain from harming self  Description: Interventions:  - Monitor patient closely, per order   - Supervise medication ingestion, monitor effects and side effects   Outcome: Progressing     Problem: Depression  Goal: Refrain from isolation  Description: Interventions:  - Develop a trusting relationship   - Encourage socialization   Outcome: Progressing     Problem: Depression  Goal: Refrain from self-neglect  Outcome: Progressing     Problem: Depression  Goal: Attend and participate in unit activities, including therapeutic, recreational, and educational groups  Description: Interventions:  - Provide therapeutic and educational activities daily, encourage attendance and participation, and document same in the medical record   Outcome: Progressing     Problem: Nutrition/Hydration-ADULT  Goal: Nutrient/Hydration intake appropriate for improving, restoring or maintaining nutritional needs  Description: Monitor and assess patient's nutrition/hydration status for malnutrition. Collaborate with interdisciplinary team and initiate plan and interventions as ordered. Monitor patient's weight and dietary intake as ordered or per policy. Utilize nutrition screening tool and intervene as necessary. Determine patient's food preferences and provide high-protein, high-caloric foods as appropriate.      INTERVENTIONS:  - Monitor oral intake, urinary output, labs, and treatment plans  - Assess nutrition and hydration status and recommend course of action  - Evaluate amount of meals eaten  - Assist patient with eating if necessary   - Allow adequate time for meals  - Recommend/ encourage appropriate diets, oral nutritional supplements, and vitamin/mineral supplements  - Order, calculate, and assess calorie counts as needed  - Recommend, monitor, and adjust tube feedings and TPN/PPN based on assessed needs  - Assess need for intravenous fluids  - Provide specific nutrition/hydration education as appropriate  - Include patient/family/caregiver in decisions related to nutrition  Outcome: Progressing

## 2023-09-23 NOTE — PROGRESS NOTES
Progress Note - Behavioral Health   Gloria Saucedo 26 y.o. male MRN: [id number]  Unit/Bed#: -01 Encounter: [id number]    Assessment/Plan   Principal Problem:    Depressive disorder  Active Problems: Moderate protein-calorie malnutrition ([address])      Recommended Treatment:   No medication changes at this time  Continue lexapro 10 mg daily for symptoms of depression  Continue melatonin 3 mg at bedtime for sleep    Continue with group therapy, milieu therapy and occupational therapy. Continue frequent safety checks and vitals per unit protocol. Case discussed with treatment team.  Risks, benefits and possible side effects of Medications: Risks, benefits, and possible side effects of medications have been explained to the patient, who verbalizes understanding    ------------------------------------------------------------    Subjective:     Per nursing report, on the inpatient psychiatric unit Samantha Sánchez has made steady progress. Darshan Pappas was noted to be visible on the unit, pleasant and engaged, attending groups and participating appropriately, socializing with peers. On nursing assessment in the evening he denied suicidal ideation and reported feelings of anxiety due to being on the inpatient psychiatric unit, but otherwise denied any other concerns. On the inpatient psychiatric unit Samantha Sánchez has been medication and meal compliant. Today, Samantha Sánchez states that he has made significant improvements since arriving to the inpatient psychiatric unit. He reports at this time this is "the best I've felt in a long time" and reports that he is hopeful for the future. He reports that he is looking forward to the future and making positive life changes when he leaves the hospital (including getting a new job - he recently lost his job at [employer]). He reports situational anxiety related to being on the inpatient psychiatric unit, but otherwise voices no complaints or concerns.     At the time of interview, Samantha Sánchez reports that he has been eating well and sleeping well in the hospital. He reports that he has been taking his psychiatric medications as directed and has not noticed any medication side effects. At the time of interview, Paula Zamudio denies suicidal ideation, homicidal ideation, visual and auditory hallucinations. Progress Toward Goals: steady improvement    Psychiatric Review of Systems:  Behavior over the last 24 hours: improved  Sleep: normal  Appetite: normal compared to baseline  Medication side effects: none verbalized  ROS: Complete review of systems is negative except as noted above.     Vital signs in last 24 hours:  Temp:  [98.8 °F (37.1 °C)-99.5 °F (37.5 °C)] 99.5 °F (37.5 °C)  HR:  [] 79  Resp:  [16-17] 17  BP: (113-119)/(50-52) 119/52    Mental Status Exam:  Appearance:  alert, good eye contact, appears stated age, casually dressed and appropriate grooming and hygiene   Behavior:  calm and cooperative   Motor: no abnormal movements and normal gait and balance   Speech:  spontaneous, clear, normal rate, normal volume and coherent   Mood:  "the best I've felt in a long time"   Affect:  Mildly anxious   Thought Process:  Organized, logical, goal-directed   Thought Content: no verbalized delusions or overt paranoia   Perceptual disturbances: denies current hallucinations and does not appear to be responding to internal stimuli at this time   Risk Potential: No active suicidal ideation, No active homicidal ideation   Cognition: oriented to person, place, time, and situation, memory grossly intact, appears to be of average intelligence, age-appropriate attention span and concentration and cognition not formally tested   Insight:  Fair   Judgment: Fair     Current Medications:  Current Facility-Administered Medications   Medication Dose Route Frequency Provider Last Rate   • acetaminophen  650 mg Oral Q6H PRN Bonnie Zarate MD     • acetaminophen  650 mg Oral Q4H PRN Bonnie Zarate MD     • acetaminophen  975 mg Oral Q6H PRN Devon Valle MD     • albuterol  1 puff Inhalation Q4H PRN Viviana Green PA-C     • aluminum-magnesium hydroxide-simethicone  30 mL Oral Q4H PRN Devon Valle MD     • haloperidol lactate  2.5 mg Intramuscular Q4H PRN Max 4/day Devon Valle MD      And   • LORazepam  1 mg Intramuscular Q4H PRN Max 4/day Devon Valle MD      And   • benztropine  0.5 mg Intramuscular Q4H PRN Max 4/day Devon Valle MD     • haloperidol lactate  5 mg Intramuscular Q4H PRN Max 4/day Devon Valle MD      And   • LORazepam  2 mg Intramuscular Q4H PRN Max 4/day Devon Valle MD      And   • benztropine  1 mg Intramuscular Q4H PRN Max 4/day Devon Valle MD     • benztropine  1 mg Oral Q4H PRN Max 6/day Devon Valle MD     • bisacodyl  10 mg Rectal Daily PRN Juli Portillo MD     • [START ON 11/25/2023] cholecalciferol  1,000 Units Oral Daily Fernando Stone MD     • cyanocobalamin  500 mcg Oral Daily Fernando Stone MD     • hydrOXYzine HCL  50 mg Oral Q6H PRN Max 4/day Devon Valle MD      Or   • diphenhydrAMINE  50 mg Intramuscular Q6H PRN Devon Valle MD     • ergocalciferol  50,000 Units Oral Weekly Fernando Stone MD     • escitalopram  10 mg Oral Daily Natanael Matias DO     • haloperidol  1 mg Oral Q6H PRN Devon Valle MD     • haloperidol  2.5 mg Oral Q4H PRN Max 4/day Devon Valle MD     • haloperidol  5 mg Oral Q4H PRN Max 4/day Devon Valle MD     • hydrOXYzine HCL  100 mg Oral Q6H PRN Max 4/day Devon Valle MD      Or   • LORazepam  2 mg Intramuscular Q6H PRN Devon Valle MD     • hydrOXYzine HCL  25 mg Oral Q6H PRN Max 4/day Devon Valle MD     • melatonin  3 mg Oral HS Devon Valle MD     • nicotine polacrilex  2 mg Oral Q1H PRN Viviana Green PA-C     • polyethylene glycol  17 g Oral Daily PRN Juli Portillo MD     • senna-docusate sodium  1 tablet Oral Daily PRN Kacey Antonia Nunn MD     • traZODone  50 mg Oral HS PRN Amador Jaramillo MD         Behavioral Health Medications: all current active meds have been reviewed. Changes as in plan section above. Laboratory results:  I have personally reviewed all pertinent laboratory/tests results.   Recent Results (from the past 48 hour(s))   ECG 12 lead    Collection Time: 09/21/23  9:56 PM   Result Value Ref Range    Ventricular Rate 85 BPM    Atrial Rate 85 BPM    ND Interval 142 ms    QRSD Interval 84 ms    QT Interval 356 ms    QTC Interval 423 ms    P Axis 52 degrees    QRS Axis 87 degrees    T Wave Axis 50 degrees   TSH, 3rd generation with Free T4 reflex    Collection Time: 09/22/23  6:06 AM   Result Value Ref Range    TSH 3RD GENERATON <0.010 (L) 0.450 - 4.500 uIU/mL   T4, free    Collection Time: 09/22/23  6:06 AM   Result Value Ref Range    Free T4 2.69 (H) 0.61 - 1.12 ng/dL   Comprehensive metabolic panel    Collection Time: 09/22/23  6:07 AM   Result Value Ref Range    Sodium 137 135 - 147 mmol/L    Potassium 4.2 3.5 - 5.3 mmol/L    Chloride 104 96 - 108 mmol/L    CO2 28 21 - 32 mmol/L    ANION GAP 5 mmol/L    BUN 17 5 - 25 mg/dL    Creatinine 0.62 0.60 - 1.30 mg/dL    Glucose 92 65 - 140 mg/dL    Calcium 9.3 8.4 - 10.2 mg/dL    AST 15 13 - 39 U/L    ALT 12 7 - 52 U/L    Alkaline Phosphatase 68 34 - 104 U/L    Total Protein 6.1 (L) 6.4 - 8.4 g/dL    Albumin 4.0 3.5 - 5.0 g/dL    Total Bilirubin 0.90 0.20 - 1.00 mg/dL    eGFR 140 ml/min/1.73sq m   CBC and differential    Collection Time: 09/22/23  6:07 AM   Result Value Ref Range    WBC 4.82 4.31 - 10.16 Thousand/uL    RBC 4.68 3.88 - 5.62 Million/uL    Hemoglobin 13.4 12.0 - 17.0 g/dL    Hematocrit 40.7 36.5 - 49.3 %    MCV 87 82 - 98 fL    MCH 28.6 26.8 - 34.3 pg    MCHC 32.9 31.4 - 37.4 g/dL    RDW 12.2 11.6 - 15.1 %    MPV 10.9 8.9 - 12.7 fL    Platelets 532 129 - 729 Thousands/uL    nRBC 0 /100 WBCs    Neutrophils Relative 45 43 - 75 %    Immat GRANS % 0 0 - 2 % Lymphocytes Relative 41 14 - 44 %    Monocytes Relative 11 4 - 12 %    Eosinophils Relative 3 0 - 6 %    Basophils Relative 0 0 - 1 %    Neutrophils Absolute 2.18 1.85 - 7.62 Thousands/µL    Immature Grans Absolute 0.01 0.00 - 0.20 Thousand/uL    Lymphocytes Absolute 1.96 0.60 - 4.47 Thousands/µL    Monocytes Absolute 0.51 0.17 - 1.22 Thousand/µL    Eosinophils Absolute 0.14 0.00 - 0.61 Thousand/µL    Basophils Absolute 0.02 0.00 - 0.10 Thousands/µL   Vitamin B12    Collection Time: 09/22/23  6:07 AM   Result Value Ref Range    Vitamin B-12 217 180 - 914 pg/mL   Folate    Collection Time: 09/22/23  6:07 AM   Result Value Ref Range    Folate 10.9 >5.9 ng/mL   Vitamin D 25 hydroxy    Collection Time: 09/22/23  6:07 AM   Result Value Ref Range    Vit D, 25-Hydroxy 19.3 (L) 30.0 - 100.0 ng/mL   Lipid panel    Collection Time: 09/22/23  6:07 AM   Result Value Ref Range    Cholesterol 77 See Comment mg/dL    Triglycerides 43 See Comment mg/dL    HDL, Direct 36 (L) >=40 mg/dL    LDL Calculated 32 0 - 100 mg/dL    Non-HDL-Chol (CHOL-HDL) 41 mg/dl        Carolee Domínguez MD

## 2023-09-24 PROCEDURE — 99232 SBSQ HOSP IP/OBS MODERATE 35: CPT

## 2023-09-24 RX ORDER — HYDROXYZINE 50 MG/1
50 TABLET, FILM COATED ORAL DAILY
Status: DISCONTINUED | OUTPATIENT
Start: 2023-09-24 | End: 2023-09-28 | Stop reason: HOSPADM

## 2023-09-24 RX ADMIN — CYANOCOBALAMIN TAB 500 MCG 500 MCG: 500 TAB at 10:00

## 2023-09-24 RX ADMIN — NICOTINE POLACRILEX 2 MG: 2 GUM, CHEWING BUCCAL at 12:50

## 2023-09-24 RX ADMIN — ESCITALOPRAM OXALATE 10 MG: 10 TABLET ORAL at 10:00

## 2023-09-24 RX ADMIN — NICOTINE POLACRILEX 2 MG: 2 GUM, CHEWING BUCCAL at 15:41

## 2023-09-24 RX ADMIN — NICOTINE POLACRILEX 2 MG: 2 GUM, CHEWING BUCCAL at 18:24

## 2023-09-24 RX ADMIN — HYDROXYZINE HYDROCHLORIDE 50 MG: 50 TABLET, FILM COATED ORAL at 10:00

## 2023-09-24 RX ADMIN — MELATONIN 3 MG: at 21:31

## 2023-09-24 NOTE — NURSING NOTE
Patient observed within milieu amongst peers for entirety of the evening. Patient reports relief in symptoms and reports feeling that he should be discharged. Patient admits to suicidal thoughts prior to admission but states that he now realizes he has a large support system and needed a medication adjustment. He is pressured and elevated in interaction. He reports poor sleep due to nightmares related to missing his girlfriend. He denies depression and endorses situational anxiety at times. Denies suicidal thoughts. q7 minute monitoring ongoing.

## 2023-09-24 NOTE — PROGRESS NOTES
Progress Note - Behavioral Health   Karen Jones 26 y.o. male MRN: [id number]  Unit/Bed#: U 224-01 Encounter: [id number]    Assessment/Plan   Principal Problem:    Depressive disorder  Active Problems: Moderate protein-calorie malnutrition ([address])    Recommended Treatment:   Started atarax 50 mg daily for symptoms of anxiety  Continue Lexapro 10 mg daily for symptoms of depression  Continue melatonin 3 mg at bedtime for sleep    At the time of interview today the patient states that he feels safe to return home. This writer informed him that the decision of discharge would be determined by his primary team.    Continue with group therapy, milieu therapy and occupational therapy. Continue frequent safety checks and vitals per unit protocol. Case discussed with treatment team.  Risks, benefits and possible side effects of Medications: Risks, benefits, and possible side effects of medications have been explained to the patient, who verbalizes understanding    ------------------------------------------------------------    Subjective:     Per nursing report, on the inpatient psychiatric unit Ezio Lakhani continues to make steady progress. On the inpatient unit he remains visible, pleasant and engaged, attending groups and participating appropriately, socializing with peers. On nursing assessment in the evening he reported sustained improvements in his symptoms of depression and anxiety. He reported struggles with sleep due to feelings of anxiety surrounding his girlfriend. On the inpatient psychiatric unit Ezio Lakhani has remained medication and meal compliant. Today, Ezio Lakhani is seen playing board games with peers on the unit. He brightens on approach. On interview he appears anxious, with increased rate of speech at times. Today, Ezio Lakhani states that he feels "great". He reports he is looking forward to the future and making positive life changes when he leaves the hospital (including getting a new job).   He reports that he remains in contact with his stepfather on the phone. He reports that he has been spending his time on the inpatient unit playing board games with peers. At the time of interview today, Riya Sher reports that he finds himself struggling with anxiety during the morning and reports that Atarax has been particularly helpful to address these symptoms. He requests that Atarax be added on as a standing medication during the daytime. At the time of interview, Riya Sher reports that he has been eating well. He reports that he slept well last night. He reports he has been taking his psychiatric medications as directed and has not noticed any medication side effects. At the time of interview today the patient states that he feels safe to return home. This writer informed him that the decision of discharge would be determined by his primary team.    Progress Toward Goals: steady improvement    Psychiatric Review of Systems:  Behavior over the last 24 hours: improved  Sleep: improving  Appetite: normal compared to baseline  Medication side effects: none verbalized  ROS: Complete review of systems is negative except as noted above.     Vital signs in last 24 hours:  Temp:  [98.4 °F (36.9 °C)-98.6 °F (37 °C)] 98.4 °F (36.9 °C)  HR:  [84-99] 84  Resp:  [16-17] 16  BP: ()/(54-59) 114/59    Mental Status Exam:  Appearance:  alert, good eye contact, appears stated age, casually dressed and appropriate grooming and hygiene   Behavior:  calm and cooperative   Motor: no abnormal movements and normal gait and balance   Speech:  spontaneous, clear, normal rate, normal volume and coherent   Mood:  "great"   Affect:  Mildly anxious   Thought Process:  Organized, logical, goal-directed   Thought Content: no verbalized delusions or overt paranoia   Perceptual disturbances: denies current hallucinations and does not appear to be responding to internal stimuli at this time   Risk Potential: No active suicidal ideation, No active homicidal ideation   Cognition: oriented to person, place, time, and situation, memory grossly intact, appears to be of average intelligence, age-appropriate attention span and concentration and cognition not formally tested   Insight:  Fair   Judgment: Fair     Current Medications:  Current Facility-Administered Medications   Medication Dose Route Frequency Provider Last Rate   • acetaminophen  650 mg Oral Q6H PRN Kathy Erazo MD     • acetaminophen  650 mg Oral Q4H PRN Kathy Erazo MD     • acetaminophen  975 mg Oral Q6H PRN Kathy Erazo MD     • albuterol  1 puff Inhalation Q4H PRN Viviana Green PA-C     • aluminum-magnesium hydroxide-simethicone  30 mL Oral Q4H PRN Kathy Erazo MD     • haloperidol lactate  2.5 mg Intramuscular Q4H PRN Max 4/day Kathy Erazo MD      And   • LORazepam  1 mg Intramuscular Q4H PRN Max 4/day Kathy Erazo MD      And   • benztropine  0.5 mg Intramuscular Q4H PRN Max 4/day Kathy Erazo MD     • haloperidol lactate  5 mg Intramuscular Q4H PRN Max 4/day Kathy Erazo MD      And   • LORazepam  2 mg Intramuscular Q4H PRN Max 4/day Kathy Erazo MD      And   • benztropine  1 mg Intramuscular Q4H PRN Max 4/day Kathy Erazo MD     • benztropine  1 mg Oral Q4H PRN Max 6/day Kathy Erazo MD     • bisacodyl  10 mg Rectal Daily PRN Ileana Wolff MD     • [START ON 11/25/2023] cholecalciferol  1,000 Units Oral Daily Makenzie Singh MD     • cyanocobalamin  500 mcg Oral Daily Makenzie Singh MD     • hydrOXYzine HCL  50 mg Oral Q6H PRN Max 4/day Kathy Erazo MD      Or   • diphenhydrAMINE  50 mg Intramuscular Q6H PRN Kathy Erazo MD     • ergocalciferol  50,000 Units Oral Weekly Makenzie Singh MD     • escitalopram  10 mg Oral Daily Bismark Bonilla DO     • haloperidol  1 mg Oral Q6H PRN Kathy Erazo MD     • haloperidol  2.5 mg Oral Q4H PRN Max 4/day Kathy Erazo MD     • haloperidol  5 mg Oral Q4H PRN Max 4/day Jesika Prakash MD     • hydrOXYzine HCL  100 mg Oral Q6H PRN Max 4/day Jesika Prakash MD      Or   • LORazepam  2 mg Intramuscular Q6H PRN Jesika Prakash MD     • hydrOXYzine HCL  25 mg Oral Q6H PRN Max 4/day Jesika Prakash MD     • hydrOXYzine HCL  50 mg Oral Daily Amol Dan MD     • melatonin  3 mg Oral HS Jesika Prakash MD     • nicotine polacrilex  2 mg Oral Q1H PRN Viviana Green PA-C     • polyethylene glycol  17 g Oral Daily PRN Kathy Williamson MD     • senna-docusate sodium  1 tablet Oral Daily PRN Jesika Prakash MD     • traZODone  50 mg Oral HS PRN Jesika Prakash MD         Behavioral Health Medications: all current active meds have been reviewed. Changes as in plan section above. Laboratory results:  I have personally reviewed all pertinent laboratory/tests results. No results found for this or any previous visit (from the past 48 hour(s)).      Amol Dan MD

## 2023-09-24 NOTE — PROGRESS NOTES
Progress Note - Janiya Greenberg 26 y.o. male MRN: [id number]    Unit/Bed#: [facility] 224-01 Encounter: [id number]        Subjective:   Patient seen and examined at bedside after reviewing the chart and discussing the case with the caring staff. Patient examined at bedside. Patient has no acute issues. On review of patient's labs patient's vitamin D level was found to be low at 19.3 and B12 level is also low 217. Physical Exam   Vitals: Blood pressure 97/54, pulse 99, temperature 98.6 °F (37 °C), temperature source Temporal, resp. rate 17, height 5' 7" (1.702 m), weight 50.1 kg (110 lb 6.4 oz), SpO2 97 %. ,Body mass index is 17.29 kg/m². Constitutional: He appears well-developed. HEENT: PERR, EOMI, MMM  Cardiovascular: Normal rate and regular rhythm. Pulmonary/Chest: Effort normal and breath sounds normal.   Abdomen: Soft, + BS, NT    Assessment/Plan:  Janiya Greenberg is a(n) 26 y.o. male with bipolar disorder.      1. Asthma. Stable. Albuterol inhaler as needed. 2.  Allergic rhinitis. Stable. 3.  Insomnia. Patient is on melatonin at bedtime. 4.  Low BMI. Dietician consulted. 5.  New vitamin D deficiency. I will put the patient on vitamin D bolus doses for 10 weeks followed by vitamin D3 1000 units daily. 6.  New vitamin B12 deficiency. I will put the patient on vitamin B12 supplements.

## 2023-09-25 PROCEDURE — 99232 SBSQ HOSP IP/OBS MODERATE 35: CPT | Performed by: HOSPITALIST

## 2023-09-25 RX ORDER — TRAZODONE HYDROCHLORIDE 100 MG/1
100 TABLET ORAL
Status: DISCONTINUED | OUTPATIENT
Start: 2023-09-25 | End: 2023-09-28 | Stop reason: HOSPADM

## 2023-09-25 RX ADMIN — MELATONIN 3 MG: at 20:46

## 2023-09-25 RX ADMIN — CYANOCOBALAMIN TAB 500 MCG 500 MCG: 500 TAB at 08:45

## 2023-09-25 RX ADMIN — NICOTINE POLACRILEX 2 MG: 2 GUM, CHEWING BUCCAL at 17:05

## 2023-09-25 RX ADMIN — HYDROXYZINE HYDROCHLORIDE 50 MG: 50 TABLET, FILM COATED ORAL at 08:45

## 2023-09-25 RX ADMIN — ESCITALOPRAM OXALATE 10 MG: 10 TABLET ORAL at 08:45

## 2023-09-25 NOTE — NURSING NOTE
Patient visible and social with peers. Attended snack. Medication compliant. Denies any SI/Hi, AV/H or anxiety. Endorses a little depression was crying earlier in the day when in group and he picked a song that reminded him of his girlfriend. Patient stated he cried a little. Patient stated he feels better now and less depressed. Medication compliant. Will continue to monitor and assess the rest of the night.

## 2023-09-25 NOTE — PROGRESS NOTES
Progress Note - Behavioral Health   Vinicius Pritchard 26 y.o. male MRN: [id number]  Unit/Bed#: U 224-01 Encounter: [id number]    Assessment/Plan   Principal Problem:    Depressive disorder  Active Problems: Moderate protein-calorie malnutrition ([address])      Behavior over the last 24 hours:  unchanged  Sleep: normal  Appetite: normal  Medication side effects: No  ROS: no complaints and all other systems are negative    Lui Nicole has been visible, social, and participatory in milieu activities. Presents with euthymic mood denying anxiety or depression. Reports "I had a change in my mood when I felt all the love around me. All of my family and friends were concerned when they heard I was in the hospital and that was all I needed."  States he had fleeting thoughts of SI and depressive symptoms prior to admission, "but I never planned on acting on it."  Currently denies SI/HI. Thoughts were organized, goal directed, and forward thinking with no delusional content verbalized. Speech tangential.  Mood controlled throughout encounter with no signs or symptoms of acute jamari or psychosis. Has been compliant with medications and meals on unit and denies any sleep disturbance. Reports he gets about 8 hours a night. Had nightmare 2 nights ago regarding losing his girlfriend then stated, "I got tearful yesterday because I heard a song that reminded me of her. I miss her."  States he has been speaking with family and friends on the phone and is willing to have staff obtain collateral from family regarding progress and safety planning.     Mental Status Evaluation:  Appearance:  Short stature, thin  male, blonde hair, casually dressed   Behavior:  Cooperative, pleasant, social, somewhat immature   Speech:  tangential   Mood:  euthymic   Affect:  mood-congruent and redirectable   Thought Process:  goal directed   Associations: circumstantial associations   Thought Content:  No overt delusions or paranoia   Perceptual Disturbances: Denies AVH, did not appear internally preoccupied   Risk Potential: Suicidal Ideations none at present  Homicidal Ideations none  Potential for Aggression No   Sensorium:  person, place, time/date and situation   Memory:  recent and remote memory grossly intact   Consciousness:  alert and awake    Attention: attention span appeared shorter than expected for age   Insight:  limited   Judgment: limited   Gait/Station: normal gait/station and normal balance   Motor Activity: no abnormal movements     Progress Toward Goals: Has been maintaining safety on unit and reports resolution of anxiety and depression. However, seems to be minimizing depression symptoms/SI prior to admission and presents as somewhat superficial regarding context of admission. Tolerating titration of Lexapro 10 mg well with no adverse effects. Identifies his friends and family as strong supports. Signed 72-hour notice today requesting termination of treatment. Recommend obtaining collateral from parents regarding patient progress and safety planning. Will continue to observe for safety under 72-hour notice. Recommended Treatment: Continue with group therapy, milieu therapy and occupational therapy. Risks, benefits and possible side effects of Medications:   Risks, benefits, and possible side effects of medications explained to patient and patient verbalizes understanding.       Medications:   all current active meds have been reviewed, continue current psychiatric medications and current meds:   Current Facility-Administered Medications   Medication Dose Route Frequency   • acetaminophen (TYLENOL) tablet 650 mg  650 mg Oral Q6H PRN   • acetaminophen (TYLENOL) tablet 650 mg  650 mg Oral Q4H PRN   • acetaminophen (TYLENOL) tablet 975 mg  975 mg Oral Q6H PRN   • albuterol (PROVENTIL HFA,VENTOLIN HFA) inhaler 1 puff  1 puff Inhalation Q4H PRN   • aluminum-magnesium hydroxide-simethicone (MAALOX) oral suspension 30 mL  30 mL Oral Q4H PRN   • haloperidol lactate (HALDOL) injection 2.5 mg  2.5 mg Intramuscular Q4H PRN Max 4/day    And   • LORazepam (ATIVAN) injection 1 mg  1 mg Intramuscular Q4H PRN Max 4/day    And   • benztropine (COGENTIN) injection 0.5 mg  0.5 mg Intramuscular Q4H PRN Max 4/day   • haloperidol lactate (HALDOL) injection 5 mg  5 mg Intramuscular Q4H PRN Max 4/day    And   • LORazepam (ATIVAN) injection 2 mg  2 mg Intramuscular Q4H PRN Max 4/day    And   • benztropine (COGENTIN) injection 1 mg  1 mg Intramuscular Q4H PRN Max 4/day   • benztropine (COGENTIN) tablet 1 mg  1 mg Oral Q4H PRN Max 6/day   • bisacodyl (DULCOLAX) rectal suppository 10 mg  10 mg Rectal Daily PRN   • [START ON 11/25/2023] cholecalciferol (VITAMIN D3) tablet 1,000 Units  1,000 Units Oral Daily   • cyanocobalamin (VITAMIN B-12) tablet 500 mcg  500 mcg Oral Daily   • hydrOXYzine HCL (ATARAX) tablet 50 mg  50 mg Oral Q6H PRN Max 4/day    Or   • diphenhydrAMINE (BENADRYL) injection 50 mg  50 mg Intramuscular Q6H PRN   • ergocalciferol (VITAMIN D2) capsule 50,000 Units  50,000 Units Oral Weekly   • escitalopram (LEXAPRO) tablet 10 mg  10 mg Oral Daily   • haloperidol (HALDOL) tablet 1 mg  1 mg Oral Q6H PRN   • haloperidol (HALDOL) tablet 2.5 mg  2.5 mg Oral Q4H PRN Max 4/day   • haloperidol (HALDOL) tablet 5 mg  5 mg Oral Q4H PRN Max 4/day   • hydrOXYzine HCL (ATARAX) tablet 100 mg  100 mg Oral Q6H PRN Max 4/day    Or   • LORazepam (ATIVAN) injection 2 mg  2 mg Intramuscular Q6H PRN   • hydrOXYzine HCL (ATARAX) tablet 25 mg  25 mg Oral Q6H PRN Max 4/day   • hydrOXYzine HCL (ATARAX) tablet 50 mg  50 mg Oral Daily   • melatonin tablet 3 mg  3 mg Oral HS   • nicotine polacrilex (NICORETTE) gum 2 mg  2 mg Oral Q1H PRN   • polyethylene glycol (MIRALAX) packet 17 g  17 g Oral Daily PRN   • senna-docusate sodium (SENOKOT S) 8.6-50 mg per tablet 1 tablet  1 tablet Oral Daily PRN   • traZODone (DESYREL) tablet 100 mg  100 mg Oral HS PRN   . Labs:  I have personally reviewed all pertinent laboratory/tests results. CBC:   Lab Results   Component Value Date    WBC 4.82 09/22/2023    RBC 4.68 09/22/2023    HGB 13.4 09/22/2023    HCT 40.7 09/22/2023    MCV 87 09/22/2023     09/22/2023    MCH 28.6 09/22/2023    MCHC 32.9 09/22/2023    RDW 12.2 09/22/2023    MPV 10.9 09/22/2023    NEUTROABS 2.18 09/22/2023     CMP:   Lab Results   Component Value Date    SODIUM 137 09/22/2023    K 4.2 09/22/2023     09/22/2023    CO2 28 09/22/2023    AGAP 5 09/22/2023    BUN 17 09/22/2023    CREATININE 0.62 09/22/2023    GLUC 92 09/22/2023    CALCIUM 9.3 09/22/2023    AST 15 09/22/2023    ALT 12 09/22/2023    ALKPHOS 68 09/22/2023    TP 6.1 (L) 09/22/2023    ALB 4.0 09/22/2023    TBILI 0.90 09/22/2023    EGFR 140 09/22/2023     Drug Screen:   Lab Results   Component Value Date    AMPMETHUR Negative 09/20/2023    BARBTUR Negative 09/20/2023    BDZUR Negative 09/20/2023    THCUR Negative 09/20/2023    COCAINEUR Negative 09/20/2023    METHADONEUR Negative 09/20/2023    OPIATEUR Negative 09/20/2023    PCPUR Negative 09/20/2023       Counseling / Coordination of Care  Total floor / unit time spent today 30 minutes. Greater than 50% of total time was spent with the patient and / or family counseling and / or coordination of care.  A description of the counseling / coordination of care: Medication education, treatment plan, supportive therapy, safety planning

## 2023-09-25 NOTE — SOCIAL WORK
Naresh Silver [phone] contacted regarding dc planning, Leticia Pond requests call tomorrow AM to schedule intake. Call ended mutually.

## 2023-09-25 NOTE — NURSING NOTE
Visible on unit. Social with peers. Pt complains of social anxiety as main stressor. Verbalizes that the atarax is very effective and "it lasts for the whole day for me when I take it in morning" Denies SI, HI, or hallucinations.

## 2023-09-25 NOTE — NURSING NOTE
Patient was pleasant and cooperative. Patient social with staff and peers. Staff support provided. Q 7 minute safety checks maintained. Patient denied SI/HI. Patient compliant with medications and groups. Patent expressed he felt he was ready for discharge. Patient signed a 72 hour notice today at 1250. Staff will continue to monitor and support.

## 2023-09-25 NOTE — PROGRESS NOTES
09/25/23   Team Meeting   Meeting Type Daily Rounds   Team Members Present   Team Members Present Physician;Nurse;   Physician Team Member Dr. Juan Pablo Carranza MD; Brock Freeman; Dr. Joanna Prieto MD   Nursing Team Member Kostas singh RN; Pipo Lenz RN   Social Work Team Member jose antonio Owens, [place]   Patient/Family Present   Patient Present No   Patient's Family Present No     Socially awkward, was upset yest for missing gf, pleasant, social, brightens on approach, slept.

## 2023-09-25 NOTE — PLAN OF CARE
Problem: Risk for Self Injury/Neglect  Goal: Treatment Goal: Remain safe during length of stay, learn and adopt new coping skills, and be free of self-injurious ideation, impulses and acts at the time of discharge  Outcome: Progressing  Goal: Refrain from harming self  Description: Interventions:  - Monitor patient closely, per order  - Develop a trusting relationship  - Supervise medication ingestion, monitor effects and side effects   Outcome: Progressing     Problem: Anxiety  Goal: Anxiety is at manageable level  Description: Interventions:  - Assess and monitor patient's anxiety level. - Monitor for signs and symptoms (heart palpitations, chest pain, shortness of breath, headaches, nausea, feeling jumpy, restlessness, irritable, apprehensive). - Collaborate with interdisciplinary team and initiate plan and interventions as ordered.   - Ethel patient to unit/surroundings  - Explain treatment plan  - Encourage participation in care  - Encourage verbalization of concerns/fears  - Identify coping mechanisms  - Assist in developing anxiety-reducing skills  - Administer/offer alternative therapies  - Limit or eliminate stimulants  Outcome: Progressing   See chart note

## 2023-09-25 NOTE — PROGRESS NOTES
Progress Note - Balbir Sanches 26 y.o. male MRN: [id number]    Unit/Bed#: [facility] 224-01 Encounter: [id number]        Subjective:   Patient seen and examined at bedside after reviewing the chart and discussing the case with the caring staff. Patient examined at bedside. Patient has no acute issues. On review of patient's labs patient's vitamin D level was found to be low at 19.3 and B12 level is also low 217. Physical Exam   Vitals: Blood pressure 111/58, pulse 101, temperature 98.2 °F (36.8 °C), temperature source Temporal, resp. rate 16, height 5' 7" (1.702 m), weight 50.1 kg (110 lb 6.4 oz), SpO2 97 %. ,Body mass index is 17.29 kg/m². Constitutional: He appears well-developed. HEENT: PERR, EOMI, MMM  Cardiovascular: Normal rate and regular rhythm. Pulmonary/Chest: Effort normal and breath sounds normal.   Abdomen: Soft, + BS, NT    Assessment/Plan:  Balbir Sanches is a(n) 26 y.o. male with bipolar disorder.      1. Asthma. Stable. Albuterol inhaler as needed. 2.  Allergic rhinitis. Stable. 3.  Insomnia. Patient is on melatonin at bedtime. 4.  Low BMI. Dietician consulted. 5.  New vitamin D deficiency. I will put the patient on vitamin D bolus doses for 10 weeks followed by vitamin D3 1000 units daily. 6.  New vitamin B12 deficiency. I will put the patient on vitamin B12 supplements.

## 2023-09-26 PROCEDURE — 99232 SBSQ HOSP IP/OBS MODERATE 35: CPT | Performed by: NURSE PRACTITIONER

## 2023-09-26 RX ORDER — ALBUTEROL SULFATE 90 UG/1
2 AEROSOL, METERED RESPIRATORY (INHALATION) AS NEEDED
Qty: 18 G | Refills: 0 | Status: SHIPPED | OUTPATIENT
Start: 2023-09-26

## 2023-09-26 RX ORDER — MELATONIN
1000 DAILY
Qty: 30 TABLET | Refills: 0 | Status: SHIPPED | OUTPATIENT
Start: 2023-11-25

## 2023-09-26 RX ORDER — ERGOCALCIFEROL 1.25 MG/1
50000 CAPSULE ORAL WEEKLY
Qty: 8 CAPSULE | Refills: 0 | Status: SHIPPED | OUTPATIENT
Start: 2023-09-30 | End: 2023-11-26

## 2023-09-26 RX ORDER — LANOLIN ALCOHOL/MO/W.PET/CERES
3 CREAM (GRAM) TOPICAL
Qty: 30 TABLET | Refills: 0 | Status: SHIPPED | OUTPATIENT
Start: 2023-09-26

## 2023-09-26 RX ADMIN — NICOTINE POLACRILEX 2 MG: 2 GUM, CHEWING BUCCAL at 09:59

## 2023-09-26 RX ADMIN — MELATONIN 3 MG: at 21:16

## 2023-09-26 RX ADMIN — CYANOCOBALAMIN TAB 500 MCG 500 MCG: 500 TAB at 08:39

## 2023-09-26 RX ADMIN — NICOTINE POLACRILEX 2 MG: 2 GUM, CHEWING BUCCAL at 22:04

## 2023-09-26 RX ADMIN — HYDROXYZINE HYDROCHLORIDE 50 MG: 50 TABLET, FILM COATED ORAL at 08:39

## 2023-09-26 RX ADMIN — ESCITALOPRAM OXALATE 10 MG: 10 TABLET ORAL at 08:39

## 2023-09-26 NOTE — DISCHARGE INSTR - APPOINTMENTS
A partial hospitalization program referral was made on your behalf. The [facility] Partial Hospitalization Program provides services for individuals 18 and older who are experiencing mental health issues. The program is open Monday through Friday with daily sessions taking place from 8:30 to 2:30. The goal of the program is to return the individual to their optimum level of functioning by providing the necessary tools to help cope with life events and their illness. The average length of stay is four weeks with length of time in program varying according to individual patient needs. Our program offers:  Psychiatric evaluation and medication management by a board certified psychiatrist  Individualized treatment plans  Group therapy   Aftercare scheduling     The [address] is a short-term, structured mental health day program offered as an alternative to hospitalization. It offers psychosocial support to individuals who are experiencing difficulty adjusting or coping in their day-to-day lives because of severe stressful and life-changing events or situations and require a more intensive service. [facility]  [address] [address]   Phone: [phone]   Fax:  [phone]                      Roro Tenorio said, "You may not control all the events that happen to you, but you can decide not to be reduced by them."   We are currently living in quite unpredictable times, where we may be feeling completely out of control. At [facility], we understand the distress which results in feeling out of control, which is why we remained open during the pandemic. [facility] continues to provide partial hospitalization services in a telehealth setting amidst COVID-19 to ensure the health and emotional safety of our clients.    We have adapted our ability to provide care to a variety of clients by offering our partial program services via the [employer]. Virtual care is being provided at  [facility]. Time  Group    9:00 to 9:30  Morning Assessment    9:30 to 10:30 Psychotherapy Group     Break    10:30-11:30 Education Group    11:45-12:30 Lunch    12:30-1:30 Allied Therapy    1:30-2:00 Wrap Up and Goal Setting    As scheduled Case Management Sessions with CM        Eligible clients need access to internet and hardware to participate in the program. They need the independent and technological ability to access the groups and participate in a virtual setting. They need to be able to set aside the interrupted time to participate in the program from 9:00am - 2pm.     Clients are offered three groups each day and they are offered up to three individual Case Management sessions via phone or Teams to work on skill building, aftercare planning, and connection to services. Clients are encouraged to inform their outpatient team that they will be attending [facility] and their  can coordinate a return to treatment with them upon discharge. Downloading and Instruction Guide    Go into your device's [facility]. Download [employer] teams. DO NOT create a user account. Once the jackson is downloaded, close it out and wait for your scheduled appointment. For an [facility] PHP group visit:  Look for an email from Mao Lozano, our clinical , with a similar title that reads “[facility] Virtual Partial Program'  Open the email  Click the link [employer]   Your jackson will automatically open. When you are prompted, choose JOIN AS A GUEST and type your name. Click JOIN NOW (again, I know it is repetitive)  Make sure you turn on your camera and microphone. Hang Up to leave meeting  [employer]      For a medication check:   Follow the instructions above to enter the PHP meeting  HANG UP (Red Phone Icon)  from the meeting to leave for your medication check  Look for an email from Timbo Ribeiro with a similar title that reads “Medication Check with Dr. Hao Foley”  Open the email  Click the link Join [employer] Teams Meeting  Your jackson will automatically open. When you are prompted, JOIN AS A GUEST and type your name  Click JOIN NOW  Turn on your camera and microphone   Upon completion of your medication check, return to the email you received regarding today's dated PHP program.   Again, click the link [employer]  Your jackson will automatically open.   When you are prompted, JOIN AS A GUEST and type your name  Click JOIN NOW  Turn on your camera and microphone

## 2023-09-26 NOTE — PLAN OF CARE
Problem: Risk for Self Injury/Neglect  Goal: Verbalize thoughts and feelings  Description: Interventions:  - Assess and re-assess patient's lethality and potential for self-injury  - Engage patient in 1:1 interactions, daily, for a minimum of 15 minutes  - Encourage patient to express feelings, fears, frustrations, hopes  - Establish rapport/trust with patient   Outcome: Progressing  Goal: Refrain from harming self  Description: Interventions:  - Monitor patient closely, per order  - Develop a trusting relationship  - Supervise medication ingestion, monitor effects and side effects   Outcome: Progressing

## 2023-09-26 NOTE — PROGRESS NOTES
Pt. Had the following items dropped off and are located in pt. Room with the pt. Blue and white MANDADALLINMON stuffy (per pt's. Nurse approval)  T-shirt x5  Long sleeve shirt x1  Sweat shirts x3  Sweat pants x3  Boxers x6  Socks x6  Slippers  Notepad  Deck of playing cards  Monopoly cards  Pt.  Has the following items in toiletry bin:  deoderant  Soap  Shampoo conditioner

## 2023-09-26 NOTE — SOCIAL WORK
Education provided on virtual php, referral process, understanding there is a wait list. Pt agreeable to OP psych [place] intake appt, declining pcp appt.  Pharm confirmed [facility].

## 2023-09-26 NOTE — SOCIAL WORK
Jerica Miller [phone]  left regarding intake appt. Sherren Master (Mother) [phone] notified of pt dc Thurs via  requesting return call.

## 2023-09-26 NOTE — NURSING NOTE
Patient is visible in the unit. Patient stated that he feels fine. Endorsed a "little" anxiety but no depression. Good insight about his illness. Goal directed about what he wants to do after discharge. Patient stated that he feels less love from the people around him. Patient stated about the concerns of his peers on the unit and said he told the provider about it but he felt like the provider just brushed it off and got a little upset about it. Reminded patient to focus on his treatment. Compliant with medications. No disturbed behavior noted. Denies SI/HI/AVH. Q 7 minutes safety checks maintained and continued.

## 2023-09-26 NOTE — SOCIAL WORK
Susan Rendon (Mother) [phone] returned contact to , states pt was frustrated over peer not being able to talk to nursing right away but later in day, pt was calm. Reports all family members feel pt sounds good on phone, no safety concerns for dc-confirmed firearms are secured, will pick pt up Thursday (will call sw back, leave vm with time). Confirmed [facility] for pharm. Call ended mutually.

## 2023-09-26 NOTE — SOCIAL WORK
Jerica Miller [phone] returned contact for intake appt, provided pt SSN for insurance check, sw will contact Michelle Ace in 30 mins to schedule appt. Sw, providers met with pt in provider office for dc planning, agreeable to dc Thurs on 72hr notice. Pt presents irritable, frustration but able to redirect, denies current SI/HI/AVh/dep/anx, list protective factor of family, girl friend. Agreeable to OP psych. Dc confirmed with Sherren Master (Mother) [phone] regarding her concern of pt being an advocate for peers, will pick pt up Thursday 11am. Call ended mutually.

## 2023-09-26 NOTE — NURSING NOTE
Patient observed socializing with peers for entirety of the evening. Somewhat childlike but cooperative. Continues to deny s/s of psychosis, depression, anxiety, SI/HI and hallucinations. Patient reports he feels more confident in home life and more positive overall. Compliant with medication as ordered. Denies pain. q7 minute monitoring ongoing.

## 2023-09-26 NOTE — SOCIAL WORK
[address]    Name and Date of Birth:  Anny Elias 26 y.o. 2000    Date of Referral: September 26, 2023    Presenting Symptoms and Stressors:      Symptoms:  depressive symptoms and anxiety  Stressors:  family problems and relationship problems    Access to Weapons:  No    Smoking Status: denies use    Substance Use:  None    Suicidal Ideation: None at present    Homicidal Ideation: None    Depressed Mood: depressed mood, hopelessness, helplessness    Rere/Hypomania: None    Psychosis: None    Agitation: No    Appetite Changes: normal appetite    Sleep Disturbance: normal sleep    Diagnoses:  1.  Depressive disorder    Current Psychiatrist or Therapist:    Psychiatrist: None  Therapist: None    Do they Require Ambulatory Assistance: No    Communication Assistance: not required     Legal Issues: None        Olman Tee

## 2023-09-26 NOTE — PROGRESS NOTES
Progress Note - Leonel Mejía 26 y.o. male MRN: [id number]    Unit/Bed#: [facility] 224-01 Encounter: [id number]        Subjective:   Patient seen and examined at bedside after reviewing the chart and discussing the case with the caring staff. Patient examined at bedside. Patient has no acute issues. Patient is being discharged on Thursday, 9/28/2023. Patient is requesting all his prescriptions. I reviewed and reconciled patient's problem list and medications. Physical Exam   Vitals: Blood pressure 102/55, pulse 83, temperature 97.7 °F (36.5 °C), temperature source Temporal, resp. rate 18, height 5' 7" (1.702 m), weight 50.1 kg (110 lb 6.4 oz), SpO2 96 %. ,Body mass index is 17.29 kg/m². Constitutional: He appears well-developed. HEENT: PERR, EOMI, MMM  Cardiovascular: Normal rate and regular rhythm. Pulmonary/Chest: Effort normal and breath sounds normal.   Abdomen: Soft, + BS, NT    Assessment/Plan:  Leonel Mejía is a(n) 26 y.o. male with bipolar disorder.      Medical clearance. Patient is medically cleared for discharge. All scripts will be sent out for the patient. 1.  Asthma. Stable. Albuterol inhaler as needed. 2.  Allergic rhinitis. Stable. 3.  Insomnia. Patient is on melatonin at bedtime. 4.  Low BMI. Dietician consulted. 5.  New vitamin D deficiency. I will put the patient on vitamin D bolus doses for 10 weeks followed by vitamin D3 1000 units daily. 6.  New vitamin B12 deficiency. I will put the patient on vitamin B12 supplements.

## 2023-09-26 NOTE — PROGRESS NOTES
09/26/23    Team Meeting   Meeting Type Daily Rounds   Team Members Present   Team Members Present Physician;Nurse;   Physician Team Member Dr. Jennifer Florian MD; Army Dick; Dr. Denia Schwarz MD   Nursing Team Member PRAVEEN Barajas   Social Work Team Member [place], [place]   Patient/Family Present   Patient Present No   Patient's Family Present No     72 hour notice 9/25 at 1250, immature, socially awkward, Dc Thurs.

## 2023-09-26 NOTE — PROGRESS NOTES
Progress Note - Behavioral Health   Tanya Long 26 y.o. male MRN: [id number]  Unit/Bed#: [facility] 224-01 Encounter: [id number]    Assessment/Plan   Principal Problem:    Depressive disorder  Active Problems: Moderate protein-calorie malnutrition ([address])      Behavior over the last 24 hours:  unchanged  Sleep: normal  Appetite: normal  Medication side effects: No  ROS: no complaints and all other systems are negative    Fox Fernández has been visible, social, and participatory in milieu activities. Today, patient was preservative over peers concerns on the unit. Verbalized some ruminations regarding "patient's rights" and his need to "speak up" for others. Responded well to limit setting and redirection. He continues to maintain safety on unit with no threats or gestures of SIB/SI. Described his family and girlfriend's strong protective factors against suicide. Denies depression or anxiety. States he is thankful for treatment and will continue medication compliance after discharge. Has been compliant with medications and meals in the milieu and denies sleep disturbance. Identifies his readiness for discharge and signed 72-hour notice 9/25/2023. Able to verbalize adequate safety plan to utilize in time of crisis upon discharge.     Mental Status Evaluation:  Appearance:  Short stature, thin  male, shaggy blonde hair, casually dressed   Behavior:  Some psychomotor agitation, redirectable, social, good eye contact   Speech:  tangential   Mood:  "Good"   Affect:  mood-congruent and Slight irritable edge, redirectable   Thought Process:  perserverative   Associations: circumstantial associations, perseverative   Thought Content:  Some ruminations, no overt delusions or paranoia   Perceptual Disturbances: Denies AVH, did not appear internally preoccupied   Risk Potential: Suicidal Ideations none  Homicidal Ideations none  Potential for Aggression No   Sensorium:  person, place, time/date and situation   Memory: recent and remote memory grossly intact   Consciousness:  alert and awake    Attention: attention span appeared shorter than expected for age   Insight:  limited   Judgment: limited   Gait/Station: normal gait/station and normal balance   Motor Activity: no abnormal movements     Progress Toward Goals: Continues to maintain safety on unit and reports resolution of anxiety and depression. Preservative and mildly ruminating today regarding peers in the milieu, however responded well to limit setting and redirection. Identifies adequate safety plan and protective factors against suicide. Will continue to observe for safety over the next 48 hours with plan to discharge upon expiration of 72-hour notice should patient maintain safety/progress. No medication changes indicated at this time. Recommended Treatment: Continue with group therapy, milieu therapy and occupational therapy. Risks, benefits and possible side effects of Medications:   Risks, benefits, and possible side effects of medications explained to patient and patient verbalizes understanding.       Medications:   all current active meds have been reviewed, continue current psychiatric medications and current meds:   Current Facility-Administered Medications   Medication Dose Route Frequency   • acetaminophen (TYLENOL) tablet 650 mg  650 mg Oral Q6H PRN   • acetaminophen (TYLENOL) tablet 650 mg  650 mg Oral Q4H PRN   • acetaminophen (TYLENOL) tablet 975 mg  975 mg Oral Q6H PRN   • albuterol (PROVENTIL HFA,VENTOLIN HFA) inhaler 1 puff  1 puff Inhalation Q4H PRN   • aluminum-magnesium hydroxide-simethicone (MAALOX) oral suspension 30 mL  30 mL Oral Q4H PRN   • haloperidol lactate (HALDOL) injection 2.5 mg  2.5 mg Intramuscular Q4H PRN Max 4/day    And   • LORazepam (ATIVAN) injection 1 mg  1 mg Intramuscular Q4H PRN Max 4/day    And   • benztropine (COGENTIN) injection 0.5 mg  0.5 mg Intramuscular Q4H PRN Max 4/day   • haloperidol lactate (HALDOL) injection 5 mg  5 mg Intramuscular Q4H PRN Max 4/day    And   • LORazepam (ATIVAN) injection 2 mg  2 mg Intramuscular Q4H PRN Max 4/day    And   • benztropine (COGENTIN) injection 1 mg  1 mg Intramuscular Q4H PRN Max 4/day   • benztropine (COGENTIN) tablet 1 mg  1 mg Oral Q4H PRN Max 6/day   • bisacodyl (DULCOLAX) rectal suppository 10 mg  10 mg Rectal Daily PRN   • [START ON 11/25/2023] cholecalciferol (VITAMIN D3) tablet 1,000 Units  1,000 Units Oral Daily   • cyanocobalamin (VITAMIN B-12) tablet 500 mcg  500 mcg Oral Daily   • hydrOXYzine HCL (ATARAX) tablet 50 mg  50 mg Oral Q6H PRN Max 4/day    Or   • diphenhydrAMINE (BENADRYL) injection 50 mg  50 mg Intramuscular Q6H PRN   • ergocalciferol (VITAMIN D2) capsule 50,000 Units  50,000 Units Oral Weekly   • escitalopram (LEXAPRO) tablet 10 mg  10 mg Oral Daily   • haloperidol (HALDOL) tablet 1 mg  1 mg Oral Q6H PRN   • haloperidol (HALDOL) tablet 2.5 mg  2.5 mg Oral Q4H PRN Max 4/day   • haloperidol (HALDOL) tablet 5 mg  5 mg Oral Q4H PRN Max 4/day   • hydrOXYzine HCL (ATARAX) tablet 100 mg  100 mg Oral Q6H PRN Max 4/day    Or   • LORazepam (ATIVAN) injection 2 mg  2 mg Intramuscular Q6H PRN   • hydrOXYzine HCL (ATARAX) tablet 25 mg  25 mg Oral Q6H PRN Max 4/day   • hydrOXYzine HCL (ATARAX) tablet 50 mg  50 mg Oral Daily   • melatonin tablet 3 mg  3 mg Oral HS   • nicotine polacrilex (NICORETTE) gum 2 mg  2 mg Oral Q1H PRN   • polyethylene glycol (MIRALAX) packet 17 g  17 g Oral Daily PRN   • senna-docusate sodium (SENOKOT S) 8.6-50 mg per tablet 1 tablet  1 tablet Oral Daily PRN   • traZODone (DESYREL) tablet 100 mg  100 mg Oral HS PRN   . Labs: I have personally reviewed all pertinent laboratory/tests results.    CBC:   Lab Results   Component Value Date    WBC 4.82 09/22/2023    RBC 4.68 09/22/2023    HGB 13.4 09/22/2023    HCT 40.7 09/22/2023    MCV 87 09/22/2023     09/22/2023    MCH 28.6 09/22/2023    MCHC 32.9 09/22/2023    RDW 12.2 09/22/2023 MPV 10.9 09/22/2023    NEUTROABS 2.18 09/22/2023     CMP:   Lab Results   Component Value Date    SODIUM 137 09/22/2023    K 4.2 09/22/2023     09/22/2023    CO2 28 09/22/2023    AGAP 5 09/22/2023    BUN 17 09/22/2023    CREATININE 0.62 09/22/2023    GLUC 92 09/22/2023    CALCIUM 9.3 09/22/2023    AST 15 09/22/2023    ALT 12 09/22/2023    ALKPHOS 68 09/22/2023    TP 6.1 (L) 09/22/2023    ALB 4.0 09/22/2023    TBILI 0.90 09/22/2023    EGFR 140 09/22/2023     Drug Screen:   Lab Results   Component Value Date    AMPMETHUR Negative 09/20/2023    BARBTUR Negative 09/20/2023    BDZUR Negative 09/20/2023    THCUR Negative 09/20/2023    COCAINEUR Negative 09/20/2023    METHADONEUR Negative 09/20/2023    OPIATEUR Negative 09/20/2023    PCPUR Negative 09/20/2023     EKG   Lab Results   Component Value Date    VENTRATE 85 09/21/2023    ATRIALRATE 85 09/21/2023    PRINT 142 09/21/2023    QRSDINT 84 09/21/2023    QTINT 356 09/21/2023    QTCINT 423 09/21/2023    PAXIS 52 09/21/2023    QRSAXIS 87 09/21/2023    TWAVEAXIS 50 09/21/2023       Counseling / Coordination of Care  Total floor / unit time spent today 35 minutes. Greater than 50% of total time was spent with the patient and / or family counseling and / or coordination of care.  A description of the counseling / coordination of care: Medication education, treatment plan, supportive therapy, safety/discharge planning

## 2023-09-27 PROBLEM — F41.9 ANXIETY: Status: ACTIVE | Noted: 2023-09-27

## 2023-09-27 PROCEDURE — 99232 SBSQ HOSP IP/OBS MODERATE 35: CPT | Performed by: NURSE PRACTITIONER

## 2023-09-27 RX ORDER — ESCITALOPRAM OXALATE 10 MG/1
10 TABLET ORAL DAILY
Qty: 30 TABLET | Refills: 0 | Status: SHIPPED | OUTPATIENT
Start: 2023-09-28 | End: 2023-10-28

## 2023-09-27 RX ORDER — HYDROXYZINE 50 MG/1
50 TABLET, FILM COATED ORAL DAILY
Qty: 30 TABLET | Refills: 0 | Status: SHIPPED | OUTPATIENT
Start: 2023-09-28 | End: 2023-10-28

## 2023-09-27 RX ADMIN — TRAZODONE HYDROCHLORIDE 100 MG: 100 TABLET ORAL at 23:09

## 2023-09-27 RX ADMIN — ESCITALOPRAM OXALATE 10 MG: 10 TABLET ORAL at 09:11

## 2023-09-27 RX ADMIN — MELATONIN 3 MG: at 22:04

## 2023-09-27 RX ADMIN — CYANOCOBALAMIN TAB 500 MCG 500 MCG: 500 TAB at 09:11

## 2023-09-27 RX ADMIN — HYDROXYZINE HYDROCHLORIDE 50 MG: 50 TABLET, FILM COATED ORAL at 09:11

## 2023-09-27 RX ADMIN — NICOTINE POLACRILEX 2 MG: 2 GUM, CHEWING BUCCAL at 19:00

## 2023-09-27 NOTE — PROGRESS NOTES
Progress Note - Balbir Sanches 26 y.o. male MRN: [id number]    Unit/Bed#: [facility] 224-01 Encounter: [id number]        Subjective:   Patient seen and examined at bedside after reviewing the chart and discussing the case with the caring staff. Patient examined at bedside. Patient has no acute symptoms. Patient is being discharged on Thursday, 9/28/2023. Patient is requesting all his prescriptions. I reviewed and reconciled patient's problem list and medications. Physical Exam   Vitals: Blood pressure 121/61, pulse 96, temperature 97.8 °F (36.6 °C), temperature source Temporal, resp. rate 17, height 5' 7" (1.702 m), weight 50.1 kg (110 lb 6.4 oz), SpO2 97 %. ,Body mass index is 17.29 kg/m². Constitutional: Patient in no acute distress. HEENT: PERR, EOMI, MMM. Cardiovascular: Normal rate and regular rhythm. Pulmonary/Chest: Effort normal and breath sounds normal.   Abdomen: Nondistended. Assessment/Plan:  Balbir Sanches is a(n) 26 y.o. male with bipolar disorder.      Medical clearance. Patient is medically cleared for discharge. All scripts will be sent out for the patient. 1.  Asthma. Stable. Albuterol inhaler as needed. 2.  Allergic rhinitis. Stable. 3.  Insomnia. Patient is on melatonin at bedtime. 4.  Low BMI. Dietician consulted. 5.  Vitamin D deficiency. Patient started on vitamin D bolus doses for 10 weeks followed by vitamin D3 1000 units daily. 6.  Vitamin B12 deficiency. Patient started on vitamin B12 supplements. The patient was discussed with Dr. Des Dobbins and he is in agreement with the above note.

## 2023-09-27 NOTE — NURSING NOTE
Patient compliant with meds and meals. Patient is social and visible, attends groups. Patient is pleasant and cooperative. Patient can be intrusive with select peers states he feels he needs to be the voice for them when they are afraid to speak up, advised patient to focus on himself patient verbalized understanding. Patient denies everything states he is excited for discharge tomorrow. Q 7 min behavioral and safety checks in place.

## 2023-09-27 NOTE — NURSING NOTE
Pt intrusive with peers. Fixated on peer issues and wanting to "speak up for them because they won't speak up for themselves". Pt voiced that he is trying to avoid a peer but peer keeps approaching him. Explained to pt to focus on self goals and wellbeing. Pt fixated on others. He endorses mild anxiety denies depression, SI/HI, A+VH. Pt complained of right shoulder pain stating that he injured it while playing with peers in dayroom. Heat pack provided. Med compliant this evening. Will continue to monitor for acute behaviors and mood changes. Q7 minute safety check maintained.

## 2023-09-27 NOTE — PROGRESS NOTES
09/27/23   Team Meeting   Meeting Type Daily Rounds   Team Members Present   Team Members Present Physician;Nurse;   Physician Team Member Dr. Jamey Romero, DO; Brock Freeman; Dr. Joanna Prieto MD   Nursing Team Member Steffanie Shelton, RN   Social Work Team Member jose antonio Owens, [place]   Patient/Family Present   Patient Present No   Patient's Family Present No     Moe dc tomorrow 11am, no behaviors, slept, visible, denies all psych symptoms, intrusive, needs firm limit setting.

## 2023-09-27 NOTE — PROGRESS NOTES
09/27/23 0798   Activity/Group Checklist   Group   (Community Meeting and Check-In)   Attendance Attended   Attendance Duration (min) 46-60   Interactions Interacted appropriately   Affect/Mood Appropriate;Bright;Calm   Goals Achieved Identified feelings; Discussed coping strategies; Increased hopefulness; Able to listen to others; Able to engage in interactions; Able to reflect/comment on own behavior;Able to manage/cope with feelings

## 2023-09-27 NOTE — SOCIAL WORK
Pt agreeable to virtual php starting Friday, education on program, how to log on explained. Pt encouraged to set up [employer] for easy access to links. Agreeable to [facility] appt rescheduled. Sw contacted [facility] [phone], spoke to Kaleb Medley who canceled intake, requests pt or php CM call upon dc from php to reschedule intake since an appt cannot be scheduled that far out, requests dc summary still be faxed tomorrow at time of dc.

## 2023-09-27 NOTE — PLAN OF CARE
Problem: Ineffective Coping  Goal: Identifies healthy coping skills  Outcome: Progressing  Goal: Participates in unit activities  Description: Interventions:  - Provide therapeutic environment   - Provide required programming   - Redirect inappropriate behaviors   Outcome: Progressing  Goal: Patient/Family verbalizes awareness of resources  Outcome: Progressing     Problem: Risk for Self Injury/Neglect  Goal: Treatment Goal: Remain safe during length of stay, learn and adopt new coping skills, and be free of self-injurious ideation, impulses and acts at the time of discharge  Outcome: Progressing  Goal: Verbalize thoughts and feelings  Description: Interventions:  - Assess and re-assess patient's lethality and potential for self-injury  - Engage patient in 1:1 interactions, daily, for a minimum of 15 minutes  - Encourage patient to express feelings, fears, frustrations, hopes  - Establish rapport/trust with patient   Outcome: Progressing  Goal: Refrain from harming self  Description: Interventions:  - Monitor patient closely, per order  - Develop a trusting relationship  - Supervise medication ingestion, monitor effects and side effects   Outcome: Progressing     Problem: Depression  Goal: Refrain from isolation  Description: Interventions:  - Develop a trusting relationship   - Encourage socialization   Outcome: Progressing  Goal: Refrain from self-neglect  Outcome: Progressing  Goal: Attend and participate in unit activities, including therapeutic, recreational, and educational groups  Description: Interventions:  - Provide therapeutic and educational activities daily, encourage attendance and participation, and document same in the medical record   Outcome: Progressing     Problem: Anxiety  Goal: Anxiety is at manageable level  Description: Interventions:  - Assess and monitor patient's anxiety level.    - Monitor for signs and symptoms (heart palpitations, chest pain, shortness of breath, headaches, nausea, feeling jumpy, restlessness, irritable, apprehensive). - Collaborate with interdisciplinary team and initiate plan and interventions as ordered. - [place] patient to unit/surroundings  - Explain treatment plan  - Encourage participation in care  - Encourage verbalization of concerns/fears  - Identify coping mechanisms  - Assist in developing anxiety-reducing skills  - Administer/offer alternative therapies  - Limit or eliminate stimulants  Outcome: Progressing     Problem: Nutrition/Hydration-ADULT  Goal: Nutrient/Hydration intake appropriate for improving, restoring or maintaining nutritional needs  Description: Monitor and assess patient's nutrition/hydration status for malnutrition. Collaborate with interdisciplinary team and initiate plan and interventions as ordered. Monitor patient's weight and dietary intake as ordered or per policy. Utilize nutrition screening tool and intervene as necessary. Determine patient's food preferences and provide high-protein, high-caloric foods as appropriate.      INTERVENTIONS:  - Monitor oral intake, urinary output, labs, and treatment plans  - Assess nutrition and hydration status and recommend course of action  - Evaluate amount of meals eaten  - Assist patient with eating if necessary   - Allow adequate time for meals  - Recommend/ encourage appropriate diets, oral nutritional supplements, and vitamin/mineral supplements  - Order, calculate, and assess calorie counts as needed  - Recommend, monitor, and adjust tube feedings and TPN/PPN based on assessed needs  - Assess need for intravenous fluids  - Provide specific nutrition/hydration education as appropriate  - Include patient/family/caregiver in decisions related to nutrition  Outcome: Progressing

## 2023-09-28 VITALS
HEART RATE: 77 BPM | RESPIRATION RATE: 18 BRPM | WEIGHT: 110.4 LBS | DIASTOLIC BLOOD PRESSURE: 55 MMHG | SYSTOLIC BLOOD PRESSURE: 113 MMHG | TEMPERATURE: 98.2 F | OXYGEN SATURATION: 97 % | HEIGHT: 67 IN | BODY MASS INDEX: 17.33 KG/M2

## 2023-09-28 PROCEDURE — 99238 HOSP IP/OBS DSCHRG MGMT 30/<: CPT | Performed by: NURSE PRACTITIONER

## 2023-09-28 RX ADMIN — HYDROXYZINE HYDROCHLORIDE 50 MG: 50 TABLET, FILM COATED ORAL at 08:39

## 2023-09-28 RX ADMIN — NICOTINE POLACRILEX 2 MG: 2 GUM, CHEWING BUCCAL at 08:39

## 2023-09-28 RX ADMIN — CYANOCOBALAMIN TAB 500 MCG 500 MCG: 500 TAB at 08:39

## 2023-09-28 RX ADMIN — ESCITALOPRAM OXALATE 10 MG: 10 TABLET ORAL at 08:39

## 2023-09-28 NOTE — PLAN OF CARE
Problem: DISCHARGE PLANNING - CARE MANAGEMENT  Goal: Discharge to post-acute care or home with appropriate resources  Description: INTERVENTIONS:  - Conduct assessment to determine patient/family and health care team treatment goals, and need for post-acute services based on payer coverage, community resources, and patient preferences, and barriers to discharge  - Address psychosocial, clinical, and financial barriers to discharge as identified in assessment in conjunction with the patient/family and health care team  - Arrange appropriate level of post-acute services according to patient’s   needs and preference and payer coverage in collaboration with the physician and health care team  - Communicate with and update the patient/family, physician, and health care team regarding progress on the discharge plan  - Arrange appropriate transportation to post-acute venues  Outcome: Completed     DC today 11am to family with php virtual starting tomorrow, [facility] intake can be scheduled after php; declined pcp appt.

## 2023-09-28 NOTE — NURSING NOTE
Patient is being discharged today with the following belongings:  x1 pokemon stuffy   x5 tshirt  x1 long sleeve shirt  x3 sweatshirt  x3 sweatpants  x6 boxers  x6 pairs of socks  x1 slippers  x1 notepad  x1 deck of cards  x1 monopoly cards  x1 deoderant  x1 soap  x1 shampoo  x1 conditoner  x1 pair of shoes  x1 hoofie  x1 bag  x1 phone  x1   x1 yellow chain  $10.00  x4 debit cards  x1 pa ID cards  x1 drivers license   x1 healthcare card  x1 ssc  x1 wallet    Reviewed with patient and signed personal belongings checklist.

## 2023-09-28 NOTE — DISCHARGE SUMMARY
Discharge Summary - [id number] [facility] [age] y.o. male MRN: [id number]  Unit/Bed#: [address] 224-01 Encounter: [id number]     Admission Date: 9/21/2023         Discharge Date: 9/28/23    Attending Psychiatrist: Dwight Patten*    Reason for Admission/HPI: Bipolar 1 disorder [facility]) [F31.9]      According to H&P by Dr. Génesis Mark 9/22/23:    History of Present Illness     Patient is a 26 y.o. male presents was admitted to psychiatric unit on a voluntarily 201 commitment basis.     Copied from ED note written by Ai Mars MD on 9/20/2023.     26 y. o. male presenting with suicidal ideation.  Patient states been struggling with depression for some time, has had worsening feelings of depression and hopelessness over the past few weeks, has also been having significantly worsening suicidal thoughts, he states that he has been preparing for his suicide by pushing others away and trying to isolate himself from others so that he hurts a few people as possible when he does kill himself.  Patient verbalized having a plan to other staff but is unwilling to discuss with me. Victor M Pallas denies homicidal ideation. Allyssa Galeano states that he went to physical exam with a new doctor this morning, screened positive on [place] suicide severity scale, was sent to the ER for further evaluation.     Copy from ED note written by Jennifer ball on 9/20/2023     Mathias Cogan is a 25 y/o male, with no known mental health diagnoses. Pt presented to ED via private transportation due to:  Pt went for a physical this morning and told the Dr he was struggling with anxiety and depression. Actively suicidal with a plan.     Patient accompanied by his mother and both calm and cooperative with the assessment. Pt states he has been feeling depressed for years. Pt has been trying to deal with this on his own involving himself in family gatherings, participating in activities, working.  Pt states for the past few months he began to prepare himself to commit suicide. Pt states he slowly pushed his friends away, now he has only 3 friends left. Pt used to have a lot of friends. Now for the past weeks he has been isolating himself from his family as well. Staying at his room, not talking to anyone. Yesterday pt was debating on killing himself by cutting his wrists. Pt reached out to his friends who then provided pt with support. Pt also reached out today to his mother who was not aware of significance of the situation. Currently pt states "I do not want to die but my mind is playing tricks with me right now and I am scared of myself". Pt denies previous attempts. Pt reports triggers from the past being left by his father "cold turkey", his job who he recently resigned from, his family pressing him to be better and more successful. Pt denies HI. Pt attempted to cut himself yesterday but the knife was dull. No prior self harming. Pt denies hallucinations. No trauma reported. Pt willing to sign himself in     On evaluation, patient is calm pleasant and cooperative. Patient admits to being depressed for a long time and then the last 2 weeks he felt like he was suicidal.  He reports going to his PCP for a physical and he reports his mother bringing up that he has been depressed with the doctor. Patient does say stressors have been feeling like he lost his friends and his girlfriend, who he says is his ex-girlfriend however it is complicated. He reports that he has been able to hide his depression for some time from everyone. At this time he feels that his worst feeling. Patient talks about being depressed when he is alone no one around and he starts to having negative self-talk and worries about what other people think about him. He reports during this time he has been isolative. He shuts down and has had crying spells. Reports having poor sleep here lately but his appetite has been without change. Having some hopelessness and high anxiety. 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 Denies having any previous inpatient admissions. Does report having suicidal thoughts and had approached himself with a gun about 3 years ago but the gun jammed. Patient reports that over the past couple of days his friends and girlfriend have been rallying behind him and this makes him feel loved and better. Reports feeling rather happy currently and his anxiety comes from being on a new environment. He does say that he is glad he is here on the unit and getting help for depression and anxiety he has had for quite a while. Denies any auditory or visual hallucinations ever. Denies any thoughts to harm himself or others. Denies any jamari like symptoms in the past.     Patient reports trying marijuana in the past but did not like it. Patient denies any substance use or alcohol currently. Patient is agreeable to start an antidepressant. Hospital Course: The patient was admitted to the inpatient psychiatric unit and started on every 7 minutes precautions. During the hospitalization the patient was attending individual therapy, group therapy, milieu therapy and occupational therapy. Psychiatric medications were titrated over the hospital stay to address depression and depressive symptoms and suicidal ideation. Bridget York was started on antidepressant Lexapro, anxiolytic medication Atarax and hypnotic medication Melatonin. Medication doses were titrated during the hospital course. Prior to beginning of treatment medications risks and benefits and possible side effects including risk of suicidality and serotonin syndrome related to treatment with antidepressants and risk of impaired next-day mental alertness, complex sleep-related behavior and dependence related to treatment with hypnotic medications were reviewed with the patient.  Bridget York verbalized understanding and agreement for treatment. While on the inpatient unit, Andreina Pond was visible, social, and participatory in milieu activities. He was engaged in treatment and compliant with medications. He verbalized remorse regarding SI prior to admission and stated "now that I know I definitely have the support of my family and friends rallying around me, it makes me feel so much better."  While on unit, he reported intermittent anxiety in the context of being in a new setting, but denied depression or SI. He was able to maintain safety and exhibited no suicidal threats or gestures toward self. Since patient reported improvement of symptoms he signed a 72-hour notice requesting discharge from hospital.    Patient was observed throughout the next 72 hours where he continued to maintain mood control, safety, and continued to deny suicidal ideation, intent, or plan. Able to verbalize adequate safety plan which includes talking with his parents, girlfriend, friends, or returning to the hospital.  He also identified his girlfriend and family as strong protective factors against suicide. He continued to report resolution of depression and endorsed mild anxiety. Upon expiration of 72-hour notice, Andreina Pond exhibited no criteria to pursue involuntary admission. He continued to maintain mood control with no signs or symptoms of acute jamari, hypomania, or psychosis. Continued to maintain safety with no threats or gestures of SIB/SI and adamantly denied suicidal or homicidal ideation, intent, or plan upon discharge. Thoughts were organized, goal directed, and forward thinking with no delusional content verbalized. He continued to verbalize adequate safety plan and protective factors against suicide. CM contacted patient's mother regarding discharge planning; mother described patient as sounding good over the telephone and identified no safety concerns (according to Ronaldo Ma note 9/26/23 @ 11:16AM).     Andreina Pond was discharged on the following psychotropic medication regimen: Lexapro 10 mg PO QD, Atarax 50mg PO QD, and Melatonin 3mg PO QHS. He patient was tolerating medications and was not reporting any significant side effects at the time of discharge. Rylie Sher signed 72 hour notice and requested discharge. At the time of the 72 hour notice expiration He had no criteria for involuntary commitment and denied any suicidal or homicidal ideation. The outpatient follow up with [facility] through [facility]  starting 9/29/23 and [facility] for psychiatric medication management was arranged by the unit  upon discharge. A 30-day supply of psychotropic medications was submitted to patient's pharmacy of choice prior to discharge. Mental Status at time of Discharge:     Appearance:  age appropriate, casually dressed and Short stature, blonde shaggy hair   Behavior:  Cooperative   Speech:  normal pitch and normal volume   Mood:  euthymic, mildly anxious   Affect:  mood-congruent   Thought Process:  goal directed   Thought Content:  No overt delusions or paranoia   Perceptual Disturbances: Denies AVH, did not appear internally preoccupied   Risk Potential: Suicidal Ideations none, Homicidal Ideations none and Potential for Aggression No   Sensorium:  person, place, time/date and situation   Cognition:  recent and remote memory grossly intact   Consciousness:  alert and awake    Attention: attention span appeared shorter than expected for age   Insight:  limited   Judgment: limited   Gait/Station: normal gait/station and normal balance   Motor Activity: no abnormal movements     Admission Diagnosis:Bipolar 1 disorder ([address]) [F31.9]    Discharge Diagnosis:   Principal Problem:    Depressive disorder  Active Problems: Moderate protein-calorie malnutrition ([address])  Resolved Problems:    * No resolved hospital problems.  *    Lab results:  Admission on 09/21/2023   Component Date Value   • Sodium 09/22/2023 137    • Potassium 09/22/2023 4.2    • Chloride 09/22/2023 104    • CO2 09/22/2023 28    • ANION GAP 09/22/2023 5    • BUN 09/22/2023 17    • Creatinine 09/22/2023 0.62    • Glucose 09/22/2023 92    • Calcium 09/22/2023 9.3    • AST 09/22/2023 15    • ALT 09/22/2023 12    • Alkaline Phosphatase 09/22/2023 68    • Total Protein 09/22/2023 6.1 (L)    • Albumin 09/22/2023 4.0    • Total Bilirubin 09/22/2023 0.90    • eGFR 09/22/2023 140    • WBC 09/22/2023 4.82    • RBC 09/22/2023 4.68    • Hemoglobin 09/22/2023 13.4    • Hematocrit 09/22/2023 40.7    • MCV 09/22/2023 87    • MCH 09/22/2023 28.6    • MCHC 09/22/2023 32.9    • RDW 09/22/2023 12.2    • MPV 09/22/2023 10.9    • Platelets 05/05/4024 248    • nRBC 09/22/2023 0    • Neutrophils Relative 09/22/2023 45    • Immat GRANS % 09/22/2023 0    • Lymphocytes Relative 09/22/2023 41    • Monocytes Relative 09/22/2023 11    • Eosinophils Relative 09/22/2023 3    • Basophils Relative 09/22/2023 0    • Neutrophils Absolute 09/22/2023 2.18    • Immature Grans Absolute 09/22/2023 0.01    • Lymphocytes Absolute 09/22/2023 1.96    • Monocytes Absolute 09/22/2023 0.51    • Eosinophils Absolute 09/22/2023 0.14    • Basophils Absolute 09/22/2023 0.02    • TSH 3RD GENERATON 09/22/2023 <0.010 (L)    • Vitamin B-12 09/22/2023 217    • Folate 09/22/2023 10.9    • Vit D, 25-Hydroxy 09/22/2023 19.3 (L)    • Cholesterol 09/22/2023 77    • Triglycerides 09/22/2023 43    • HDL, Direct 09/22/2023 36 (L)    • LDL Calculated 09/22/2023 32    • Non-HDL-Chol (CHOL-HDL) 09/22/2023 41    • Ventricular Rate 09/21/2023 85    • Atrial Rate 09/21/2023 85    • HI Interval 09/21/2023 142    • QRSD Interval 09/21/2023 84    • QT Interval 09/21/2023 356    • QTC Interval 09/21/2023 423    • P Axis 09/21/2023 52    • QRS Axis 09/21/2023 87    • T Wave Axis 09/21/2023 50    • Free T4 09/22/2023 2.69 (H)        Discharge Medications:  Current Discharge Medication List      START taking these medications    Details cholecalciferol (VITAMIN D3) 1,000 units tablet Take 1 tablet (1,000 Units total) by mouth daily Do not start before November 25, 2023. Qty: 30 tablet, Refills: 0    Associated Diagnoses: Vitamin D deficiency      cyanocobalamin (VITAMIN B-12) 500 MCG tablet Take 1 tablet (500 mcg total) by mouth daily Do not start before September 27, 2023. Qty: 30 tablet, Refills: 0    Associated Diagnoses: Vitamin B12 deficiency      ergocalciferol (VITAMIN D2) 50,000 units Take 1 capsule (50,000 Units total) by mouth once a week for 9 doses Do not start before September 30, 2023. Qty: 8 capsule, Refills: 0    Associated Diagnoses: Vitamin D deficiency      escitalopram (LEXAPRO) 10 mg tablet Take 1 tablet (10 mg total) by mouth daily Do not start before September 28, 2023. Qty: 30 tablet, Refills: 0    Associated Diagnoses: Depressive disorder; Anxiety      hydrOXYzine HCL (ATARAX) 50 mg tablet Take 1 tablet (50 mg total) by mouth in the morning Do not start before September 28, 2023. Qty: 30 tablet, Refills: 0    Associated Diagnoses: Anxiety      melatonin 3 mg Take 1 tablet (3 mg total) by mouth daily at bedtime  Qty: 30 tablet, Refills: 0    Associated Diagnoses: Insomnia      nicotine polacrilex (NICORETTE) 2 mg gum Chew 1 each (2 mg total) every hour as needed for smoking cessation  Qty: 100 each, Refills: 0    Associated Diagnoses: Tobacco abuse            Current Discharge Medication List      STOP taking these medications       famciclovir (FAMVIR) 500 mg tablet Comments:   Reason for Stopping:              Current Discharge Medication List      CONTINUE these medications which have CHANGED    Details   albuterol (Ventolin HFA) 90 mcg/act inhaler Inhale 2 puffs as needed for wheezing or shortness of breath  Qty: 18 g, Refills: 0    Comments: Substitution to a formulary equivalent within the same pharmaceutical class is authorized.   Associated Diagnoses: Asthma            Current Discharge Medication List Discharge instructions/Information to patient and family:   See after visit summary for information provided to patient and family. Provisions for Follow-Up Care:  See after visit summary for information related to follow-up care and any pertinent home health orders. Discharge Statement:    I spent 25 minutes discharging the patient. This time was spent on the day of discharge. I had direct contact with the patient on the day of discharge. I reviewed with Scooby Sutherland importance of compliance with medications and outpatient treatment after discharge. I discussed the medication regimen and possible side effects of the medications with Scooby Sutherland prior to discharge. At the time of discharge he was tolerating psychiatric medications. I discussed outpatient follow up with Scooby Sutherland. I reviewed with Scooby Sutherland crisis plan and safety plan upon discharge. Scooby Sutherland agreed to abstain from drug and alcohol use after discharge. Scooby Sutherland signed 72 hour notice and requested discharge. At the time of the 72 hour notice expiration he had no criteria for involuntary commitment and denied any suicidal or homicidal ideation.   No records found for controlled prescriptions according to [address] [place] [address] YAMILA Aguayo 09/28/23

## 2023-09-28 NOTE — NURSING NOTE
Patient discharged home without incident. Patient verbalized understanding of all discharge instructions and medications. Patient denies any suicidal ideations. Safety plan reviewed and reinforced. All belongings accounted for and returned to patient.

## 2023-09-28 NOTE — SOCIAL WORK
Per [facility], pt to start php 9/29/23 at 8:00 AM with intakes with the psychiatrist Dr Matthew Wynne MD followed by an intake with the .

## 2023-09-28 NOTE — NURSING NOTE
Patient visible socializing with peers. Bright on approach, pleasant and cooperative. Excited for discharge. Patient said he feels this place helped him with medications but was not what he needed for his mental health, that was love from his family which he hadn't been feeling previous to admission. Denies SI,HI,AVH, anxiety and depression. Safety checks continue Q 7 minutes.

## 2023-09-28 NOTE — BH TRANSITION RECORD
Contact Information: If you have any questions, concerns, pended studies, tests and/or procedures, or emergencies regarding your inpatient behavioral health visit. Please contact Ava Gary Man # [phone] and ask to speak to a , nurse or physician. A contact is available 24 hours/ 7 days a week at this number. Summary of Procedures Performed During your Stay:  Below is a list of major procedures performed during your hospital stay and a summary of results:  - No major procedures performed. Pending Studies (From admission, onward)     Start     Ordered    09/22/23 1128  Folate  Once         09/22/23 1127              Please follow up on the above pending studies with your PCP and/or referring provider.

## 2023-09-28 NOTE — PROGRESS NOTES
09/28/23   Team Meeting   Meeting Type Daily Rounds   Team Members Present   Team Members Present Physician;Nurse;   Physician Team Member Dr. Mitchell Holman DO; Suhas Browne; Dr. Fawn Tanner MD   Nursing Team Member Deanna Lara RN   Social Work Team Member [place]   Patient/Family Present   Patient Present No   Patient's Family Present No   DC today 11am to family with php virtual starting tomorrow, [facility] intake can be scheduled after php; declined pcp appt.

## 2023-09-28 NOTE — PROGRESS NOTES
Progress Note - Daren Hernandez 26 y.o. male MRN: [id number]    Unit/Bed#: [facility] 224-01 Encounter: [id number]        Subjective:   Patient seen and examined at bedside after reviewing the chart and discussing the case with the caring staff. Patient examined at bedside. Patient has no acute symptoms. Patient is being discharged on today, Thursday 9/28/2023. Patient is requesting all his prescriptions. I reviewed and reconciled patient's problem list and medications. Physical Exam   Vitals: Blood pressure 113/55, pulse 77, temperature 98.2 °F (36.8 °C), temperature source Temporal, resp. rate 18, height 5' 7" (1.702 m), weight 50.1 kg (110 lb 6.4 oz), SpO2 97 %. ,Body mass index is 17.29 kg/m². Constitutional: Patient in no acute distress. HEENT: PERR, EOMI, MMM. Cardiovascular: Normal rate and regular rhythm. Pulmonary/Chest: Effort normal and breath sounds normal.   Abdomen: Nondistended. Assessment/Plan:  Daren Hernandez is a(n) 26 y.o. male with bipolar disorder.      Medical clearance. Patient is medically cleared for discharge. All scripts will be sent out for the patient. 1.  Asthma. Stable. Albuterol inhaler as needed. 2.  Allergic rhinitis. Stable. 3.  Insomnia. Patient is on melatonin at bedtime. 4.  Low BMI. Dietician consulted. 5.  Vitamin D deficiency. Patient started on vitamin D bolus doses for 10 weeks followed by vitamin D3 1000 units daily. 6.  Vitamin B12 deficiency. Patient started on vitamin B12 supplements. The patient was discussed with Dr. Enio Farias and he is in agreement with the above note.

## 2023-09-29 ENCOUNTER — TELEMEDICINE (OUTPATIENT)
Dept: PSYCHIATRY | Facility: CLINIC | Age: 23
End: 2023-09-29
Payer: COMMERCIAL

## 2023-09-29 ENCOUNTER — TELEMEDICINE (OUTPATIENT)
Dept: PSYCHOLOGY | Facility: CLINIC | Age: 23
End: 2023-09-29
Payer: COMMERCIAL

## 2023-09-29 DIAGNOSIS — F41.9 ANXIETY: ICD-10-CM

## 2023-09-29 DIAGNOSIS — F33.1 MODERATE EPISODE OF RECURRENT MAJOR DEPRESSIVE DISORDER (HCC): Primary | ICD-10-CM

## 2023-09-29 PROCEDURE — 90792 PSYCH DIAG EVAL W/MED SRVCS: CPT | Performed by: STUDENT IN AN ORGANIZED HEALTH CARE EDUCATION/TRAINING PROGRAM

## 2023-09-29 PROCEDURE — H0035 MH PARTIAL HOSP TX UNDER 24H: HCPCS

## 2023-09-29 NOTE — BH TREATMENT PLAN
Assessment/Plan:      Diagnoses and all orders for this visit:    Moderate episode of recurrent major depressive disorder ([address])    Anxiety          Subjective:     Patient ID: Xavier Rice is a 26 y.o. male. [facility] Treatment Plan   AREAS OF NEED: Moderate episode of MDD and Anxiety as evidenced by  isolating, distancing himself from others,feeling exhausted, decrease in appetite, decrease in concentration, and recent SI due to stress with friends, recent break up, not working , and family stress. Date Initiated: 09/29/23    Strengths: "I am working on myself. I know what I need to do."    LONG TERM GOAL:   Date Initiated: 09/29/23  1.0 I will identify 3 signs that I am feeling less depressed and anxious and more productive in my day to day life. Target Date: 10/27/23  Completion Date:       SHORT TERM OBJECTIVES:     Date Initiated: 09/29/23  1.1 I will create a list identifying coping skills that I have learned and discuss how I plan to build them into my schedule. I will choose at least 1 different coping skill daily to practice . Revision Date:   Target Date: 10/10/23  Completion Date:     Date Initiated: 09/29/23  1.2  I will make at least 1 social contact per day other then on-line friends and begin to identify and express my emotions to others and seek support when I am feeling anxious and/or depressed. Revision Date:   Target Date: 10/10/23  Completion Date:    Date Initiated: 09/29/23  1.3 I will take medications as prescribed and share questions and concerns if arise. Revision Date:  Target Date: 10/10/23  Completion Date:     Date Initiated: 09/29/23  1.4 I will identify 3 ways my supports can assist in my recovery and agree to staff/support contact as indicated.     Revision Date:  Target Date: 10/10/23  Completion Date:          7 DAY REVISION:    Date Initiated:  Revision Date:   Target Date:   Completion Date:      PSYCHIATRY:  Date Initiated:  09/29/23  Medication Management and Education       Revision Date:       The person(s) responsible for carrying out the plan is Lamine Zayas DO and Bennett Koawlski PA-C    NURSING/SYMPTOM EDUCATION:   Date Initiated: 09/29/23  1.1, 1.2. 1.3, 1.4 This RN/Or Therapist will provide wellness/symptoms and skill education groups three to five days weekly to educate Zhanna Dickey on signs and symptoms of diagnoses, skills to manage, and medication questions that will be addressed by the treatment team.    Revision date: The person(s) responsible for carrying out the plan is MARIELENA Andrea, BRANDIW ; Sabra Easley RN, BSN    PSYCHOLOGY:   Date Initiated: 09/29/23       1.1, 1.2, 1.4 Provide psychotherapy group 5 times per week to allow opportunity for Zhanna Dickey  to explore stressors and ways of coping. Revision Date:   The person(s) responsible for carrying out the plan is MARIELENA Andrea,MISTY; Brady Gordon    ALLIED THERAPY:   Date Initiated: 09/29/23  1.1,1.2 Engage Zhanna Dickey in AT group 5 times weekly to encourage development and use of wellness tools to decrease symptoms and promote recovery through meaningful activity. Revision Date:       The person(s) responsible for carrying out the plan is SHAZIA SantamariaBC ; MARSHA Andrea [facility]    CASE MANAGEMENT:   Date Initiated: 09/29/23      1.0 This  will meet with Zhanna Dickey  3-4 times weekly to assess treatment progress, discharge planning, connection to community supports and UR as indicated. Revision Date:   The person(s) responsible for carrying out the plan is Dominique Chi    TREATMENT REVIEW/COMMENTS:     DISCHARGE CRITERIA: Identify 3 signs of progress and complete a crisis plan. DISCHARGE PLAN: Connect with identified outpatient providers. Estimated Length of Stay: 10 treatment days             Diagnosis and Treatment Plan explained to Kurt Giuseppe relates understanding diagnosis and is agreeable to Treatment Plan.         CLIENT COMMENTS / Please share your thoughts, feelings, need and/or experiences regarding your treatment plan with Staff. Please see follow up note with comments. Signatures can be found on [facility] Treatment plan consent form.
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
## 2023-09-29 NOTE — PSYCH
Subjective:     Patient ID: Gloria Saucedo is a 26 y.o. male. [facility] Clinical Progress Notes      Specialized Services Documentation  Therapist must complete separate progress note for each specific clinical activity in which the individual participated during the day. Group Psychotherapy  This group was facilitated virtually in a private office using HIPAA Compliant and Approved Microsoft Teams. Gloria Saucedo consented to the use of tele-video modality of treatment. ([id number]) Gloria Saucedo actively shared in psychotherapy group exploring the benefits of exercise to support mental wellness. Group explored healthy exercises, barriers to exercise, strategies to overcome barriers, and Qigong exercise for beginners. Good progress toward goal noted. Continue psychotherapy to encourage self-awareness and home practice of skills to support wellness.    Treatment Plan Objective: 1.1, 1.2   Therapist: Mathieu HURST RN

## 2023-09-29 NOTE — PSYCH
Subjective:     Patient ID: Chidi Carlton is a 26 y.o. male. [facility] Clinical Progress Notes      Specialized Services Documentation  Therapist must complete separate progress note for each specific clinical activity in which the individual participated during the day. Other This writer will contact [phone] on Monday. Case Management Note    Kathie Cannon, MSJOHN, LSW    Current suicide risk : Low       [id number] Met with Chidi Carlton via TEAMS. Reviewed program, initial paperwork reviewed: Consent for Treatment, PHP handbook, HIPPA, General Consent, Client Bill of Rights, and Smoking/Drug and Alcohol Policy. Release of Information obtained for emergency contact - Wilma Jiménez(mother) [phone] and PCP and Health Care Coordination Form. Chidi Carlton has hard copies of all paperwork and verbally gave consent. Reviewed and given on call number. PCP notified of admission and health care coordination form sent. Completed initial psycho-social evaluation and initial treatment goals discussed. I,Moe Cedeno,am physically unable to provide a signature; however, I understand the nature of and am in agreement with the documentation presented to me via TEAMS. I have received a copy through My [facility] and/or the [facility]. I freely give verbal consent. Name of Document (s):Consent for Treatment, PHP handbook, [address] General Consent, Client Bill of Rights, and Smoking/Drug and Alcohol Policy,Health Care Coordination Form, Release of Information   Witness to verbal consent: Kathie Cannon  Witness to verbal consent: Fabian Medel    Medications changes/added/denied? No - See Dr. Jonathan Reeves Note    Treatment session number: Assessment and day 1    Individual Case Management Visit provided today?  yes    [facility] follow up physician's orders: Admit to [facility] - See Dr. Jonathan Reeves Note

## 2023-09-29 NOTE — PSYCH
Visit Time    Visit Start Time: 4962  Visit Stop Time: 1330  Total Visit Duration: 60 minutes    Subjective:     Patient ID: Leonel Mejía is a 26 y.o. male. [facility] Clinical Progress Notes      Specialized Services Documentation  Therapist must complete separate progress note for each specific clinical activity in which the individual participated during the day. Group Psychotherapy - Wellness Assessment  This group was facilitated virtually in a private office using HIPAA compliant and approved [employer] teams. Leonel Mejía consented to the use of tele-video modality of treatment. Leonel Mejía attended group on the Wellness Assessment. The group engaged in the wellness assessment, which evaluates progress on several different areas of wellness/wellbeing: physical, emotional, cognitive, vocational, social and spiritual. Clients rated their progress and discussed areas that need work. By completing and discussing areas of progress, goals and challenges, members are connected and reminded that, in their mental health struggle, they are not alone. Topics of discussion revolved around setting goals, coping with change, methods to achieve goals. Members also engaged in a mindfulness nature exercise. Leonel Mejía continues to make progress towards goals through participation in group activity and personal disclosures. Amada Marroquin shared that he would like to improve his spiritual score. Amada Marroquin shared that he struggles to prioritize or meet his own needs. Continue to run daily group psychotherapy to meet treatment needs and encourage Leonel Mejía to practice skills outside of program.      Tx Plan Objective: 1.1,1.2,1.4  Therapist: [facility]      Education Therapy   [id number] Leonel Mejía did not attend due to intake. [id number] Leonel Mejía engaged throughout the treatment day. Was engaged in learning related to Illness, Medication, Aftercare, and Wellness Tools.  Staff utilized Verbal, Written, A/V, and Demonstration teaching methods. Juan Carlos Posada shared area of learning and set a goal for outside of program to prioritize his needs.     Tx Plan Objective: 1.1,1.2,1.4, Therapist: Jeffy Guallpa, [address]
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
## 2023-09-29 NOTE — PSYCH
Visit Time    Visit Start Time: 0930  Visit Stop Time: 9873  Total Visit Duration: 45 minutes    Subjective:     Patient ID: Anisa Fishman is a 26 y.o. y.o. male. [facility] Clinical Progress Notes      Specialized Services Documentation  Therapist must complete separate progress note for each specific clinical activity in which the individual participated during the day. Allied Therapy  Anisa Fishman arrived late due to evaluation and then was attentive as he shared in music therapy group focused on DBT skill wise mind. He appeared to engage in tasks exploring differences between reasonable and emotion mind and ways to get to wise mind. Group explored the benefits of mindfulness and practiced ways to slow down to begin to develop wise mind. Group reinforced role of “participating with wise mind” in order to prevent getting stuck in the past or fears of the future. He appeared to take notes initially but also appeared to be typing and potentially distracted. Inconsistent initial effort toward treatment goal noted. Continue AT to encourage healthy skill development and practice of explored strategies.   Tx Plan Objective: 1.1Therapist:  Pura ELAM

## 2023-09-29 NOTE — PSYCH
Virtual Regular Visit    Verification of patient location:    Patient is located at Home in the following state in which I hold an active license [place]      Assessment/Plan:    Problem List Items Addressed This Visit        Other    Anxiety    Moderate episode of recurrent major depressive disorder ([address]) - Primary       Goals addressed in session:           Reason for visit is PHP VIRTUAL GROUP DUE TO virtual preference of care      Encounter provider [facility], [address] [place] [address]    Provider located at [address] [facility]  [address] [place] [address]      Recent Visits  No visits were found meeting these conditions. Showing recent visits within past 7 days and meeting all other requirements  Today's Visits  Date Type Provider Dept   09/29/23 Telemedicine [address] PARTIAL PSYCH GROUP THERAPY [facility]   Showing today's visits and meeting all other requirements  Future Appointments  No visits were found meeting these conditions. Showing future appointments within next 150 days and meeting all other requirements       The patient was identified by name and date of birth. Reece Ramos was informed that this is a telemedicine visit and that the visit is being conducted [employer]. He agrees to proceed. .  My office door was closed. No one else was in the room. He acknowledged consent and understanding of privacy and security of the video platform. The patient has agreed to participate and understands they can discontinue the visit at any time. Patient is aware this is a billable service. Subjective  Reece Ramos is a 26 y.o. male  . HPI     Past Medical History:   Diagnosis Date   • Anxiety    • Asthma    • Depression    • Growth delay    • Testosterone deficiency        No past surgical history on file.     Current Outpatient Medications   Medication Sig Dispense Refill   • albuterol (Ventolin HFA) 90 mcg/act inhaler Inhale 2 puffs as needed for wheezing or shortness of breath 18 g 0   • [START ON 11/25/2023] cholecalciferol (VITAMIN D3) 1,000 units tablet Take 1 tablet (1,000 Units total) by mouth daily Do not start before November 25, 2023. 30 tablet 0   • cyanocobalamin (VITAMIN B-12) 500 MCG tablet Take 1 tablet (500 mcg total) by mouth daily Do not start before September 27, 2023. 30 tablet 0   • [START ON 9/30/2023] ergocalciferol (VITAMIN D2) 50,000 units Take 1 capsule (50,000 Units total) by mouth once a week for 9 doses Do not start before September 30, 2023. 8 capsule 0   • escitalopram (LEXAPRO) 10 mg tablet Take 1 tablet (10 mg total) by mouth daily Do not start before September 28, 2023. 30 tablet 0   • hydrOXYzine HCL (ATARAX) 50 mg tablet Take 1 tablet (50 mg total) by mouth in the morning Do not start before September 28, 2023. 30 tablet 0   • melatonin 3 mg Take 1 tablet (3 mg total) by mouth daily at bedtime 30 tablet 0   • nicotine polacrilex (NICORETTE) 2 mg gum Chew 1 each (2 mg total) every hour as needed for smoking cessation 100 each 0     No current facility-administered medications for this visit. No Known Allergies    Review of Systems    Video Exam    There were no vitals filed for this visit.     Physical Exam     I spent FOUR GROUP HOURS PLUS CASE MANAGEMENT minutes with patient today in which greater than 50% of time was spent in counseling/coordination of care regarding PHP - SEE NOTES

## 2023-10-02 ENCOUNTER — DOCUMENTATION (OUTPATIENT)
Dept: PSYCHOLOGY | Facility: CLINIC | Age: 23
End: 2023-10-02

## 2023-10-02 ENCOUNTER — APPOINTMENT (OUTPATIENT)
Dept: PSYCHOLOGY | Facility: CLINIC | Age: 23
End: 2023-10-02
Payer: COMMERCIAL

## 2023-10-02 NOTE — PROGRESS NOTES
Subjective:     Patient ID: Carlos Escobar is a 26 y.o. male. [facility] Clinical Progress Notes      Specialized Services Documentation  Therapist must complete separate progress note for each specific clinical activity in which the individual participated during the day. Other ([id number]) Spoke with Beverly Sue from [facility] with a contact number1 [phone]  , using Tax ID [id number]. [employer] location address of [address] [place] [address] with a billing address of [place], [place].     ([id number]) Spoke with Andrestejal Natalia from [facility] with a contact number [phone]  , using Tax ID [id number]. [facility] location address of [address] [place] [address] with a billing address of [place], [place]. Carlos Escobar was authorized 10 days from 9/29/23 to 10/13/23 with an authorization code of [id number]. Case Management Note    MARIELENA Daniel    Current suicide risk : Unable to assess due to absence. Carlos Escobar was a no call/no show for program today 10/02/23.  contacted Carlos Escobar at [phone]. This writer left a  requesting a return phone call. Reminded Vickey Martin of the attendance/ [employer]. Was informed that if he did not contact the  by 2pm his emergency contact would be called. 1441- This writer contacted [employer] emergency contact (his mother) Yas Piña. Left a  inquiring about Moe's attendance today. Requested a call back and informed about the attendance and [facility] policy. Unable to review tx plan due to non-attendance     Medications changes/added/denied? No    Treatment session number: N/A    Individual Case Management Visit provided today? No    [facility] follow up physician's orders: None at this time.

## 2023-10-03 ENCOUNTER — TELEMEDICINE (OUTPATIENT)
Dept: PSYCHOLOGY | Facility: CLINIC | Age: 23
End: 2023-10-03
Payer: COMMERCIAL

## 2023-10-03 DIAGNOSIS — F33.1 MODERATE EPISODE OF RECURRENT MAJOR DEPRESSIVE DISORDER (HCC): Primary | ICD-10-CM

## 2023-10-03 DIAGNOSIS — F41.9 ANXIETY: ICD-10-CM

## 2023-10-03 PROCEDURE — H0035 MH PARTIAL HOSP TX UNDER 24H: HCPCS

## 2023-10-03 NOTE — PSYCH
Subjective:     Patient ID: Anny Elias is a 26 y.o. male. [facility] Clinical Progress Notes      Specialized Services Documentation  Therapist must complete separate progress note for each specific clinical activity in which the individual participated during the day. Group Psychotherapy Life Skills ([id number]) Anny Elias was not engaged in an open processing group which was facilitated virtually in a private office using HIPAA Compliant and Approved [employer] Teams. Rylie Sher consented to the use of tele-video modality of treatment and was virtually present for group psychotherapy today. The group discussed building connections and supports. Rylie Sher did not participate in the conversations related to the topics. Rylie Sher little progress towards goals through verbal participation in group; to accomplish long term goals continue to utilize skills learned in programming. Rylie Sher was observed to be distracted in group by playing/chatting on his computer. This writer observed him leaning into the another computer, typing , and the computer screen changing colors. Rylie Sher was distracted and was not paying attention in groups. Continue with psychotherapy to educate and encourage use of wellness tools. Tx Plan Objective: 1.1,1.2 Therapist: [facility]    Education Therapy   [id number] Anny Elias was not present for group. [id number] Anny Elias engaged throughout the treatment day. Was engaged in learning related to Illness, Medication, Aftercare, and Wellness Tools. Staff utilized Verbal, Written, A/V, and Demonstration teaching methods. Anny Elias shared area of learning and set a goal for outside of program to be present.       Tx Plan Objective: 1.1,1.2,1.4 Therapist:  MARIELENA Jordan, MISTY    Case Management Note    MISTY Jordan    Current suicide risk : Low     7363- This writer left Rylie Sher a voicemail since he was not in program to inform him that if he did not arrive by 10am he would be a ncns for the 2nd time. [id number] Garcia reported texting this writer yesterday to inform me that he did not receive a link to group. This writer informed Linh Garcia that a return phone call was requested when the voicemail was left yesterday with him and his mom. Linh Garcia reported not receiving a link today. This writer asked him to check his email and he read the subject line of yesterdays group link and case management link. This writer informed him that those were the links from yesterday and asked him to double check his email again for the group link and case management link for today. Linh Garcia shared that he found a case management link for today at 11:30 and the group link. This writer informed him that we would be meeting at 11:30 for case management and reviewing his treatment plan. Linh Garcia acknowledged the meeting and reported that he was going to join the group meeting at that time. 1 [facility] did not join case management link. Unable to review treatment plan. Case management schedule and med check time for tomorrow were emailed to him. Medications changes/added/denied? No    Treatment session number: 2    Individual Case Management Visit provided today?  Yes     [facility] follow up physician's orders: none at this time

## 2023-10-03 NOTE — PSYCH
Patient ID: Gloria Saucedo is a 26 y.o. male. [facility] Clinical Progress Notes      Specialized Services Documentation  Therapist must complete separate progress note for each specific clinical activity in which the individual participated during the day. Group Psychotherapy    [id number] Gloria Saucedo participated quietly in a psychotherapy group focused on grounding. Participants were provided with a definition of grounding and how/why the skill can be useful. The group practiced many different types of grounding techniques and exercises to get hands-on experience. Participants were provided with note-cards to create a personal grounding plan, including when to use the skill (indications) and list grounding exercises they found most useful. Group members were encouraged to engage in open discussion, share, and ask questions throughout. Gloria Saucedo shared that they were putting driving and talking on their grounding technique card. Continue to note progress towards goals. Continue with psychotherapy to encourage further development of grounding skills.     Tx Plan Objective 1.1, 1.2, 1.4 Therapist: Marty Weinstein, [address]

## 2023-10-03 NOTE — PSYCH
Virtual Regular Visit    Verification of patient location:    Patient is located at Home in the following state in which I hold an active license [place]      Assessment/Plan:    Problem List Items Addressed This Visit        Other    Anxiety    Moderate episode of recurrent major depressive disorder ([address]) - Primary       Goals addressed in session:           Reason for visit is PHP VIRTUAL GROUP DUE TO virtual preference of care     Encounter provider [facility], [address] [place] [address]    [place] located at [address] [address] [address]      Recent Visits  Date Type Provider Dept   09/29/23 Telemedicine [address] [place] PARTIAL PSYCH GROUP THERAPY [facility]   Showing recent visits within past 7 days and meeting all other requirements  Today's Visits  Date Type Provider Dept   10/03/23 Telemedicine [address] [place] PARTIAL PSYCH GROUP THERAPY [facility]   Showing today's visits and meeting all other requirements  Future Appointments  No visits were found meeting these conditions. Showing future appointments within next 150 days and meeting all other requirements       The patient was identified by name and date of birth. Anisa Fishman was informed that this is a telemedicine visit and that the visit is being conducted [employer]. He agrees to proceed. .  My office door was closed. No one else was in the room. He acknowledged consent and understanding of privacy and security of the video platform. The patient has agreed to participate and understands they can discontinue the visit at any time. Patient is aware this is a billable service. Clement Fishman is a 26 y.o. male  . HPI     Past Medical History:   Diagnosis Date   • Anxiety    • Asthma    • Depression    • Growth delay    • Testosterone deficiency        No past surgical history on file.     Current Outpatient Medications Medication Sig Dispense Refill   • albuterol (Ventolin HFA) 90 mcg/act inhaler Inhale 2 puffs as needed for wheezing or shortness of breath 18 g 0   • [START ON 11/25/2023] cholecalciferol (VITAMIN D3) 1,000 units tablet Take 1 tablet (1,000 Units total) by mouth daily Do not start before November 25, 2023. 30 tablet 0   • cyanocobalamin (VITAMIN B-12) 500 MCG tablet Take 1 tablet (500 mcg total) by mouth daily Do not start before September 27, 2023. 30 tablet 0   • ergocalciferol (VITAMIN D2) 50,000 units Take 1 capsule (50,000 Units total) by mouth once a week for 9 doses Do not start before September 30, 2023. 8 capsule 0   • escitalopram (LEXAPRO) 10 mg tablet Take 1 tablet (10 mg total) by mouth daily Do not start before September 28, 2023. 30 tablet 0   • hydrOXYzine HCL (ATARAX) 50 mg tablet Take 1 tablet (50 mg total) by mouth in the morning Do not start before September 28, 2023. 30 tablet 0   • melatonin 3 mg Take 1 tablet (3 mg total) by mouth daily at bedtime 30 tablet 0   • nicotine polacrilex (NICORETTE) 2 mg gum Chew 1 each (2 mg total) every hour as needed for smoking cessation 100 each 0     No current facility-administered medications for this visit. No Known Allergies    Review of Systems    Video Exam    There were no vitals filed for this visit.     Physical Exam     I spent FOUR GROUP HOURS PLUS CASE MANAGEMENT minutes with patient today in which greater than 50% of time was spent in counseling/coordination of care regarding PHP - SEE NOTES

## 2023-10-03 NOTE — PSYCH
Visit Time    Visit Start Time: 6614  Visit Stop Time: 5659  Total Visit Duration: 60 minutes    Subjective:     Patient ID: Carlos Escobar is a 26 y.o. y.o. male. [facility] Clinical Progress Notes      Specialized Services Documentation  Therapist must complete separate progress note for each specific clinical activity in which the individual participated during the day. This group was facilitated virtually in a private office using HIPAA Compliant and Approved Microsoft Teams. Carlos Escobar consented to the use of tele-video modality of treatment. Allied Therapy  Carlos Escobar moderately shared in music therapy group focused on DBT mindfulness strategies “what” skills. Group tasks and discussions explored practice of “what” skills through observing, describing and participating. Group engaged in therapist led PMR however he did not appear to be engaging in the exercise but typing on his computer. When asked to share, he identified they could practice mindfulness today through remembering the good times with his dog as it was the one year anniversary of the dog's death. Minimal effort noted toward treatment goal.  Continue AT to encourage the development and practice of mindfulness strategies to alleviate symptoms and support wellness.    Tx Plan Objective: 1.1, Therapist:  Isi ELAM

## 2023-10-04 ENCOUNTER — APPOINTMENT (OUTPATIENT)
Dept: PSYCHOLOGY | Facility: CLINIC | Age: 23
End: 2023-10-04
Payer: COMMERCIAL

## 2023-10-04 ENCOUNTER — TELEMEDICINE (OUTPATIENT)
Dept: PSYCHIATRY | Facility: CLINIC | Age: 23
End: 2023-10-04
Payer: COMMERCIAL

## 2023-10-04 ENCOUNTER — DOCUMENTATION (OUTPATIENT)
Dept: PSYCHOLOGY | Facility: CLINIC | Age: 23
End: 2023-10-04

## 2023-10-04 DIAGNOSIS — F33.1 MODERATE EPISODE OF RECURRENT MAJOR DEPRESSIVE DISORDER (HCC): Primary | ICD-10-CM

## 2023-10-04 DIAGNOSIS — F41.9 ANXIETY: ICD-10-CM

## 2023-10-04 PROCEDURE — 99213 OFFICE O/P EST LOW 20 MIN: CPT | Performed by: PHYSICIAN ASSISTANT

## 2023-10-04 NOTE — PROGRESS NOTES
Assessment/Plan:       Diagnoses and all orders for this visit:     Moderate episode of recurrent major depressive disorder (HCC)     Anxiety            Subjective:      Patient ID: Janiya Greenberg is a 26 y.o. male.     [facility] Treatment Plan   AREAS OF NEED: Moderate episode of MDD and Anxiety as evidenced by  isolating, distancing himself from others,feeling exhausted, decrease in appetite, decrease in concentration, and recent SI due to stress with friends, recent break up, not working , and family stress. Date Initiated: 09/29/23     Strengths: "I am working on myself. I know what I need to do."     LONG TERM GOAL:   Date Initiated: 09/29/23  1.0 I will identify 3 signs that I am feeling less depressed and anxious and more productive in my day to day life. Target Date: 10/27/23  Completion Date: 10/4/23-discharge per patient request to out patient level of care        SHORT TERM OBJECTIVES:      Date Initiated: 09/29/23  1.1 I will create a list identifying coping skills that I have learned and discuss how I plan to build them into my schedule. I will choose at least 1 different coping skill daily to practice . Revision Date:  10/4/23-discharge per patient request to out patient level of care  Target Date: 10/10/23  Completion Date:  10/4/23-discharge per patient request to out patient level of care       Date Initiated: 09/29/23  1.2  I will make at least 1 social contact per day other then on-line friends and begin to identify and express my emotions to others and seek support when I am feeling anxious and/or depressed. Revision Date:  10/4/23-discharge per patient request to out patient level of care  Target Date: 10/10/23  Completion Date: 10/4/23-discharge per patient request to out patient level of care     Date Initiated: 09/29/23  1.3 I will take medications as prescribed and share questions and concerns if arise.     Revision Date: 10/4/23-discharge per patient request to out patient level of care  Target Date: 10/10/23  Completion Date:  10/4/23-discharge per patient request to out patient level of care       Date Initiated: 09/29/23  1.4 I will identify 3 ways my supports can assist in my recovery and agree to staff/support contact as indicated. Revision Date: 10/4/23-discharge per patient request to out patient level of care  Target Date: 10/10/23  Completion Date: 10/4/23-discharge per patient request to out patient level of care              7 DAY REVISION:     Date Initiated:  Revision Date:   Target Date:   Completion Date:        PSYCHIATRY:  Date Initiated:  09/29/23  Medication Management and Education       Revision Date:  10/4/23-discharge per patient request to out patient level of care        The person(s) responsible for carrying out the plan is Jatinder Stephens DO and Ethan Ruiz PA-C     NURSING/SYMPTOM EDUCATION:   Date Initiated: 09/29/23  1.1, 1.2. 1.3, 1.4 This RN/Or Therapist will provide wellness/symptoms and skill education groups three to five days weekly to educate Balbir Sanches on signs and symptoms of diagnoses, skills to manage, and medication questions that will be addressed by the treatment team.    Revision date: 10/4/23-discharge per patient request to out patient level of care    The person(s) responsible for carrying out the plan is MARIELENA Crooks, LSW ; Yancy Clark RN, BSN     PSYCHOLOGY:   Date Initiated: 09/29/23       1.1, 1.2, 1.4 Provide psychotherapy group 5 times per week to allow opportunity for Balbir Sanches  to explore stressors and ways of coping.    Revision Date:  10/4/23-discharge per patient request to out patient level of care    The person(s) responsible for carrying out the plan is MARIELENA Crooks,MISTY; Brady Shrestha     ALLIED THERAPY:   Date Initiated: 09/29/23  1.1,1.2 Engage Balbri Sanches in AT group 5 times weekly to encourage development and use of wellness tools to decrease symptoms and promote recovery through meaningful activity. Revision Date:  10/4/23-discharge per patient request to out patient level of care        The person(s) responsible for carrying out the plan is Jimy Amaral , [facility] ; MARSHA Morataya, [facility]     CASE MANAGEMENT:   Date Initiated: 09/29/23      1.0 This  will meet with Abelardo Diana  3-4 times weekly to assess treatment progress, discharge planning, connection to community supports and UR as indicated. Revision Date:  10/4/23-discharge per patient request to out patient level of care    The person(s) responsible for carrying out the plan is Sloane Turner     TREATMENT REVIEW/COMMENTS:      DISCHARGE CRITERIA: Identify 3 signs of progress and complete a crisis plan. DISCHARGE PLAN: Connect with identified outpatient providers.    Estimated Length of Stay: 10 treatment days                Diagnosis and Treatment Plan explained to Flo Fleischer relates understanding diagnosis and is agreeable to Treatment Plan.         CLIENT COMMENTS / Please share your thoughts, feelings, need and/or experiences regarding your treatment plan with Staff. Marianna Bagley see follow up note with comments.     Signatures can be found on [facility] Treatment plan consent form.

## 2023-10-04 NOTE — PROGRESS NOTES
Subjective:     Patient ID: Mathias Cogan is a 26 y.o. male. [facility] Clinical Progress Notes      Specialized Services Documentation  Therapist must complete separate progress note for each specific clinical activity in which the individual participated during the day. Case Management Note    MARIELENA Pedroza    Current suicide risk : low    [phone]- Ari Chappell states that, " I don't need this because I am not through anything in this current state and I do not fully open up like I would with someone one on one. Discussed potential discharge if outpatient could be established. This writer told Ari Chappell he could be discharged between today and Friday if he could get an appointment set up. Ari Chappell inquired that if he set up an appointment today if he could be discharged from the program. This writer informed him that he would be discharged per patient request to outpatient therapy if he felt like he could not wait until Friday for a discharge. Ari Chappell requested to attend group later to call [facility] and schedule an outpatient appointment. Reviewed tx plan. Denies SI. Confirms he has lots of support. I,Moe Cedeno,am physically unable to provide a signature; however, I understand the nature of and am in agreement with the documentation presented to me via TEAMS. I have received a copy through My Chart and/or the [employer] [facility]. I freely give verbal consent. Name of Document (s):tx plan  Witness to verbal consent: Uziel Ross  Witness to verbal consent: Sorenson Her     [id number]- Adenike Arrieta reported that he would like to discharge today since he was able to set up and appointment with [facility] for on intake on 10/10/23 at 12:30. He shared that he wanted to look for a job today and go to his dentist appointment . This writer agreed to DC today and told him to please attend his med check so Marguerite Saavedra could clear him for discharge. Ari Chappell requested MITCHELL be completed for [facility].     IMoe,am physically unable to provide a signature; however, I understand the nature of and am in agreement with the documentation presented to me via TEAMS. I have received a copy through My Chart and/or the [employer] [facility]. I freely give verbal consent. Name of Document (s):MITCHELL  Witness to verbal consent: Karolina Leo  Witness to verbal consent: Veryl Homestead contacted about DC for [facility]. [phone]- This writer called Isabel Mcwilliams to obtain more information for MITCHELL. MITCHELL completed for Isabel Mcwilliams. See discharge summary for treatment details. Aftercare providers to receive discharge summary. Medications changes/added/denied? NA    Treatment session number: NA    Individual Case Management Visit provided today?  NA    [facility] follow up physician's orders: Discharge per patient request to outpatient level of care-See 's note
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
## 2023-10-04 NOTE — PSYCH
Virtual Regular Visit    Verification of patient location:    Patient is located at Home in the following state in which I hold an active license [place]    Encounter provider Rocky Maurer PA-C    Provider located at  [address] [address] [address] [phone]      Recent Visits  Date Type Provider Dept   09/29/23 4951 Danyel Aguilar DO [facility] Yulissa Black recent visits within past 7 days and meeting all other requirements  Future Appointments  No visits were found meeting these conditions. Showing future appointments within next 150 days and meeting all other requirements       The patient was identified by name and date of birth. Anisa Fishman was informed that this is a telemedicine visit and that the visit is being conducted [employer]. He agrees to proceed. .  My office door was closed. No one else was in the room. He acknowledged consent and understanding of privacy and security of the video platform. The patient has agreed to participate and understands they can discontinue the visit at any time. Patient is aware this is a billable service. Progress Note - [facility]  Anisa Fishman 26 y.o. male MRN: [id number]     Progress at partial program: improving. Chart reviewed and discussed in treatment team.  Jimi Elder seen by Dr Dov Barlow 9/29 at which time meds unchanged. Jimi Elder asking for d/c today and has f/u with OP provider next week. Jimi Elder states he feels good. Feels supported by family and friends. Says he is "more patient and calm". Is sleeping 8-10 hrs and denies irritability, mood reactivity or impulsive behavior.   Denies SI.      ROS:   As above otherwise negative    Active Ambulatory Problems     Diagnosis Date Noted   • Depressive disorder 09/22/2023   • Moderate protein-calorie malnutrition ([address]) 09/23/2023   • Anxiety 09/27/2023   • Moderate episode of recurrent major depressive disorder ([address]) 09/29/2023     Resolved Ambulatory Problems     Diagnosis Date Noted   • No Resolved Ambulatory Problems     Past Medical History:   Diagnosis Date   • Asthma    • Depression    • Growth delay    • Testosterone deficiency      Family History   Problem Relation Age of Onset   • Depression Mother    • Bipolar disorder Brother      No Known Allergies       Mental Status Evaluation:    Appearance:  age appropriate, casually dressed   Behavior:  pleasant, cooperative   Speech:  normal rate and volume   Mood:  euthymic   Affect:  normal range and intensity   Thought Process:  goal directed   Associations: intact associations   Thought Content:  no overt delusions   Perceptual Disturbances: none   Risk Potential: Suicidal ideation - None  Homicidal ideation - None   Sensorium:  oriented to person, place and time/date   Memory:  recent and remote memory grossly intact   Consciousness:  alert and awake   Attention: attention span and concentration are age appropriate   Insight:  intact   Judgment: intact   Gait/Station: unable to assess   Motor Activity: unable to assess today due to virtual visit       Laboratory results: I have personally reviewed all pertinent laboratory/tests results.       Assessment   Diagnoses and all orders for this visit:    Moderate episode of recurrent major depressive disorder (HCC)       Current Outpatient Medications   Medication Sig Dispense Refill   • albuterol (Ventolin HFA) 90 mcg/act inhaler Inhale 2 puffs as needed for wheezing or shortness of breath 18 g 0   • [START ON 11/25/2023] cholecalciferol (VITAMIN D3) 1,000 units tablet Take 1 tablet (1,000 Units total) by mouth daily Do not start before November 25, 2023. 30 tablet 0   • cyanocobalamin (VITAMIN B-12) 500 MCG tablet Take 1 tablet (500 mcg total) by mouth daily Do not start before September 27, 2023. 30 tablet 0   • ergocalciferol (VITAMIN D2) 50,000 units Take 1 capsule (50,000 Units total) by mouth once a week for 9 doses Do not start before September 30, 2023. 8 capsule 0   • escitalopram (LEXAPRO) 10 mg tablet Take 1 tablet (10 mg total) by mouth daily Do not start before September 28, 2023. 30 tablet 0   • hydrOXYzine HCL (ATARAX) 50 mg tablet Take 1 tablet (50 mg total) by mouth in the morning Do not start before September 28, 2023. 30 tablet 0   • melatonin 3 mg Take 1 tablet (3 mg total) by mouth daily at bedtime 30 tablet 0   • nicotine polacrilex (NICORETTE) 2 mg gum Chew 1 each (2 mg total) every hour as needed for smoking cessation 100 each 0     No current facility-administered medications for this visit. Plan    Planned medication and treatment changes:  Ok for discharge. Cont d/c meds unchanged- lexapro 10 mg qam and atarax 50 mg/d. F/u OP provider next week- says he has enough meds until then. All current active medications have been reviewed. Discharge planning      Risks / Benefits of Treatment:    Risks, benefits, and possible side effects of medications explained to patient and patient verbalizes understanding and agrees to medications. Counseling / Coordination of Care:    Patient's progress discussed with staff in treatment team meeting. Medications, treatment progress and treatment plan reviewed with patient.     Nelly Louie PA-C 10/04/23

## 2023-10-05 ENCOUNTER — APPOINTMENT (OUTPATIENT)
Dept: PSYCHOLOGY | Facility: CLINIC | Age: 23
End: 2023-10-05
Payer: COMMERCIAL

## 2023-10-06 ENCOUNTER — APPOINTMENT (OUTPATIENT)
Dept: PSYCHOLOGY | Facility: CLINIC | Age: 23
End: 2023-10-06
Payer: COMMERCIAL

## 2023-10-09 ENCOUNTER — APPOINTMENT (OUTPATIENT)
Dept: PSYCHOLOGY | Facility: CLINIC | Age: 23
End: 2023-10-09
Payer: COMMERCIAL

## 2023-10-10 ENCOUNTER — APPOINTMENT (OUTPATIENT)
Dept: PSYCHOLOGY | Facility: CLINIC | Age: 23
End: 2023-10-10
Payer: COMMERCIAL

## 2023-10-11 ENCOUNTER — APPOINTMENT (OUTPATIENT)
Dept: PSYCHOLOGY | Facility: CLINIC | Age: 23
End: 2023-10-11
Payer: COMMERCIAL

## 2023-10-12 ENCOUNTER — APPOINTMENT (OUTPATIENT)
Dept: PSYCHOLOGY | Facility: CLINIC | Age: 23
End: 2023-10-12
Payer: COMMERCIAL

## 2023-10-13 ENCOUNTER — APPOINTMENT (OUTPATIENT)
Dept: PSYCHOLOGY | Facility: CLINIC | Age: 23
End: 2023-10-13
Payer: COMMERCIAL

## 2024-04-20 ENCOUNTER — HOSPITAL ENCOUNTER (EMERGENCY)
Facility: HOSPITAL | Age: 24
Discharge: HOME | End: 2024-04-20
Attending: EMERGENCY MEDICINE
Payer: COMMERCIAL

## 2024-04-20 VITALS
RESPIRATION RATE: 18 BRPM | HEART RATE: 105 BPM | SYSTOLIC BLOOD PRESSURE: 154 MMHG | TEMPERATURE: 98 F | DIASTOLIC BLOOD PRESSURE: 76 MMHG | OXYGEN SATURATION: 93 %

## 2024-04-20 DIAGNOSIS — K04.7 DENTAL ABSCESS: Primary | ICD-10-CM

## 2024-04-20 PROCEDURE — 99282 EMERGENCY DEPT VISIT SF MDM: CPT

## 2024-04-20 PROCEDURE — 99284 EMERGENCY DEPT VISIT MOD MDM: CPT | Performed by: NURSE PRACTITIONER

## 2024-04-20 RX ORDER — HYDROCODONE BITARTRATE AND ACETAMINOPHEN 5; 325 MG/1; MG/1
1 TABLET ORAL EVERY 6 HOURS PRN
Qty: 12 TABLET | Refills: 0 | Status: SHIPPED | OUTPATIENT
Start: 2024-04-20

## 2024-04-20 RX ORDER — PENICILLIN V POTASSIUM 500 MG/1
500 TABLET ORAL 4 TIMES DAILY
Qty: 40 TABLET | Refills: 0 | Status: SHIPPED | OUTPATIENT
Start: 2024-04-20 | End: 2024-04-30

## 2024-04-20 RX ORDER — NAPROXEN SODIUM 220 MG
220 TABLET ORAL 2 TIMES DAILY WITH MEALS
Qty: 14 TABLET | Refills: 0 | Status: SHIPPED | OUTPATIENT
Start: 2024-04-20 | End: 2024-04-27

## 2024-04-20 NOTE — ED NOTES
Pt seen, assessed, and discharged by provider independent of nursing care.     Jannie Montelongo RN  04/20/24 7759

## 2024-04-20 NOTE — ED PROVIDER NOTES
History  Chief Complaint   Patient presents with    Dental Pain     Pt c/o left sided dental pain x 2 months      23-year-old male with admitted history of dental problems presenting here with left lower dental pain and concern for dental abscess formation.  He has had dental abscess in the past.  Currently attempting to schedule with a dentist but unsuccessful so far.  Waiting for callback.  He denies any fever or chills.  No facial swelling at this point.  No drainable abscess appreciated.  No trismus or anything that would suggest Vivek's angina or more severe infection.      Dental Pain  Associated symptoms: no fever        Prior to Admission Medications   Prescriptions Last Dose Informant Patient Reported? Taking?   albuterol (Ventolin HFA) 90 mcg/act inhaler   No No   Sig: Inhale 2 puffs as needed for wheezing or shortness of breath   cholecalciferol (VITAMIN D3) 1,000 units tablet   No No   Sig: Take 1 tablet (1,000 Units total) by mouth daily Do not start before November 25, 2023.   cyanocobalamin (VITAMIN B-12) 500 MCG tablet   No No   Sig: Take 1 tablet (500 mcg total) by mouth daily Do not start before September 27, 2023.   ergocalciferol (VITAMIN D2) 50,000 units   No No   Sig: Take 1 capsule (50,000 Units total) by mouth once a week for 9 doses Do not start before September 30, 2023.   escitalopram (LEXAPRO) 10 mg tablet   No No   Sig: Take 1 tablet (10 mg total) by mouth daily Do not start before September 28, 2023.   hydrOXYzine HCL (ATARAX) 50 mg tablet   No No   Sig: Take 1 tablet (50 mg total) by mouth in the morning Do not start before September 28, 2023.   melatonin 3 mg   No No   Sig: Take 1 tablet (3 mg total) by mouth daily at bedtime   nicotine polacrilex (NICORETTE) 2 mg gum   No No   Sig: Chew 1 each (2 mg total) every hour as needed for smoking cessation      Facility-Administered Medications: None       Past Medical History:   Diagnosis Date    Anxiety     Asthma     Depression      Growth delay     Testosterone deficiency        History reviewed. No pertinent surgical history.    Family History   Problem Relation Age of Onset    Depression Mother     Bipolar disorder Brother      I have reviewed and agree with the history as documented.    E-Cigarette/Vaping    E-Cigarette Use Current Every Day User      E-Cigarette/Vaping Substances    Nicotine Yes      Social History     Tobacco Use    Smoking status: Never   Vaping Use    Vaping status: Every Day    Substances: Nicotine   Substance Use Topics    Alcohol use: Not Currently    Drug use: Not Currently       Review of Systems   Constitutional:  Negative for chills and fever.   HENT:  Positive for dental problem. Negative for ear pain and sore throat.    Eyes:  Negative for pain and visual disturbance.   Respiratory:  Negative for cough and shortness of breath.    Cardiovascular:  Negative for chest pain and palpitations.   Gastrointestinal:  Negative for abdominal pain and vomiting.   Genitourinary:  Negative for dysuria and hematuria.   Musculoskeletal:  Negative for arthralgias and back pain.   Skin:  Negative for color change and rash.   Neurological:  Negative for seizures and syncope.   All other systems reviewed and are negative.      Physical Exam  Physical Exam  Vitals and nursing note reviewed.   Constitutional:       General: He is not in acute distress.     Appearance: He is well-developed.   HENT:      Head: Normocephalic and atraumatic.      Mouth/Throat:      Dentition: Abnormal dentition. Dental caries and dental abscesses present.   Eyes:      General:         Right eye: No discharge.         Left eye: No discharge.      Conjunctiva/sclera: Conjunctivae normal.   Cardiovascular:      Rate and Rhythm: Normal rate.   Pulmonary:      Effort: Pulmonary effort is normal. No respiratory distress.   Abdominal:      General: There is no distension.      Tenderness: There is no guarding.   Musculoskeletal:         General: No deformity.       Cervical back: Normal range of motion and neck supple.   Skin:     General: Skin is warm and dry.   Neurological:      Mental Status: He is alert and oriented to person, place, and time.      Coordination: Coordination normal.         Vital Signs  ED Triage Vitals [04/20/24 0957]   Temperature Pulse Respirations Blood Pressure SpO2   98 °F (36.7 °C) 105 18 154/76 93 %      Temp Source Heart Rate Source Patient Position - Orthostatic VS BP Location FiO2 (%)   Temporal Monitor Sitting Left arm --      Pain Score       --           Vitals:    04/20/24 0957   BP: 154/76   Pulse: 105   Patient Position - Orthostatic VS: Sitting         Visual Acuity      ED Medications  Medications - No data to display    Diagnostic Studies  Results Reviewed       None                   No orders to display              Procedures  Procedures         ED Course                               SBIRT 22yo+      Flowsheet Row Most Recent Value   Initial Alcohol Screen: US AUDIT-C     1. How often do you have a drink containing alcohol? 0 Filed at: 04/20/2024 0958   2. How many drinks containing alcohol do you have on a typical day you are drinking?  0 Filed at: 04/20/2024 0958   3a. Male UNDER 65: How often do you have five or more drinks on one occasion? 0 Filed at: 04/20/2024 0958   Audit-C Score 0 Filed at: 04/20/2024 0958   DESTINI: How many times in the past year have you...    Used an illegal drug or used a prescription medication for non-medical reasons? Never Filed at: 04/20/2024 0958                      Medical Decision Making  Dental abscess without any concern for Vivek's angina or worse infectious process no facial cellulitis.             Disposition  Final diagnoses:   Dental abscess     Time reflects when diagnosis was documented in both MDM as applicable and the Disposition within this note       Time User Action Codes Description Comment    4/20/2024 10:23 AM Cyrus Judd Add [K04.7] Dental abscess           ED Disposition        ED Disposition   Discharge    Condition   Stable    Date/Time   Sat Apr 20, 2024 1023    Comment   Moe Neglia discharge to home/self care.                   Follow-up Information       Follow up With Specialties Details Why Contact Info    [facility]  Schedule an appointment as soon as possible for a visit  For Continued Evaluation [address] [facility] [address]  [phone]    [facility]  Call  For Continued Evaluation [phone]    [address] [place] [address]     [address] [place] [address]            Patient's Medications   Discharge Prescriptions    HYDROCODONE-ACETAMINOPHEN (NORCO) 5-325 MG PER TABLET    Take 1 tablet by mouth every 6 (six) hours as needed for pain for up to 12 doses Max Daily Amount: 4 tablets       Start Date: 4/20/2024 End Date: --       Order Dose: 1 tablet       Quantity: 12 tablet    Refills: 0    NAPROXEN SODIUM (ALEVE) 220 MG TABLET    Take 1 tablet (220 mg total) by mouth 2 (two) times a day with meals for 7 days       Start Date: 4/20/2024 End Date: 4/27/2024       Order Dose: 220 mg       Quantity: 14 tablet    Refills: 0    PENICILLIN V POTASSIUM (VEETID) 500 MG TABLET    Take 1 tablet (500 mg total) by mouth 4 (four) times a day for 10 days       Start Date: 4/20/2024 End Date: 4/30/2024       Order Dose: 500 mg       Quantity: 40 tablet    Refills: 0       No discharge procedures on file.    PDMP Review         Value Time User    PDMP Reviewed  Yes 9/27/2023 11:41 AM YAMILA Narvaez            ED Provider  Electronically Signed by             YAMILA Kerr  04/20/24 1028

## 2024-04-20 NOTE — Clinical Note
Moe Cedeno was seen and treated in our emergency department on 4/20/2024.                Diagnosis:     Moe  may return to work on return date.    He may return on this date: 04/21/2024         If you have any questions or concerns, please don't hesitate to call.      YAMILA Kerr    ______________________________           _______________          _______________  Hospital Representative                              Date                                Time